# Patient Record
Sex: MALE | Race: OTHER | HISPANIC OR LATINO
[De-identification: names, ages, dates, MRNs, and addresses within clinical notes are randomized per-mention and may not be internally consistent; named-entity substitution may affect disease eponyms.]

---

## 2018-05-29 PROBLEM — Z00.00 ENCOUNTER FOR PREVENTIVE HEALTH EXAMINATION: Status: ACTIVE | Noted: 2018-05-29

## 2018-06-06 ENCOUNTER — APPOINTMENT (OUTPATIENT)
Dept: UROLOGY | Facility: CLINIC | Age: 63
End: 2018-06-06
Payer: COMMERCIAL

## 2018-06-06 VITALS
OXYGEN SATURATION: 98 % | DIASTOLIC BLOOD PRESSURE: 82 MMHG | HEIGHT: 72 IN | WEIGHT: 201 LBS | HEART RATE: 54 BPM | RESPIRATION RATE: 16 BRPM | BODY MASS INDEX: 27.22 KG/M2 | SYSTOLIC BLOOD PRESSURE: 130 MMHG

## 2018-06-06 PROCEDURE — 99213 OFFICE O/P EST LOW 20 MIN: CPT

## 2019-12-31 ENCOUNTER — OUTPATIENT (OUTPATIENT)
Dept: OUTPATIENT SERVICES | Facility: HOSPITAL | Age: 64
LOS: 1 days | End: 2019-12-31
Payer: COMMERCIAL

## 2019-12-31 ENCOUNTER — APPOINTMENT (OUTPATIENT)
Dept: CT IMAGING | Facility: IMAGING CENTER | Age: 64
End: 2019-12-31
Payer: COMMERCIAL

## 2019-12-31 DIAGNOSIS — I77.810 THORACIC AORTIC ECTASIA: ICD-10-CM

## 2019-12-31 PROCEDURE — 71275 CT ANGIOGRAPHY CHEST: CPT

## 2019-12-31 PROCEDURE — 71275 CT ANGIOGRAPHY CHEST: CPT | Mod: 26

## 2020-01-17 ENCOUNTER — APPOINTMENT (OUTPATIENT)
Dept: ELECTROPHYSIOLOGY | Facility: CLINIC | Age: 65
End: 2020-01-17
Payer: COMMERCIAL

## 2020-01-17 ENCOUNTER — NON-APPOINTMENT (OUTPATIENT)
Age: 65
End: 2020-01-17

## 2020-01-17 VITALS
SYSTOLIC BLOOD PRESSURE: 105 MMHG | HEIGHT: 72 IN | HEART RATE: 79 BPM | OXYGEN SATURATION: 98 % | DIASTOLIC BLOOD PRESSURE: 59 MMHG | BODY MASS INDEX: 28.21 KG/M2

## 2020-01-17 VITALS — BODY MASS INDEX: 28.21 KG/M2 | WEIGHT: 208 LBS

## 2020-01-17 DIAGNOSIS — F17.210 NICOTINE DEPENDENCE, CIGARETTES, UNCOMPLICATED: ICD-10-CM

## 2020-01-17 DIAGNOSIS — Z72.89 OTHER PROBLEMS RELATED TO LIFESTYLE: ICD-10-CM

## 2020-01-17 DIAGNOSIS — I10 ESSENTIAL (PRIMARY) HYPERTENSION: ICD-10-CM

## 2020-01-17 PROCEDURE — 93000 ELECTROCARDIOGRAM COMPLETE: CPT

## 2020-01-17 PROCEDURE — 99205 OFFICE O/P NEW HI 60 MIN: CPT

## 2020-01-17 RX ORDER — ACYCLOVIR 400 MG/1
400 TABLET ORAL
Refills: 0 | Status: ACTIVE | COMMUNITY

## 2020-01-17 NOTE — PHYSICAL EXAM
[General Appearance - Well Developed] : well developed [Normal Appearance] : normal appearance [Well Groomed] : well groomed [General Appearance - Well Nourished] : well nourished [No Deformities] : no deformities [General Appearance - In No Acute Distress] : no acute distress [Normal Conjunctiva] : the conjunctiva exhibited no abnormalities [Eyelids - No Xanthelasma] : the eyelids demonstrated no xanthelasmas [No Oral Pallor] : no oral pallor [Normal Oral Mucosa] : normal oral mucosa [No Oral Cyanosis] : no oral cyanosis [Normal Jugular Venous V Waves Present] : normal jugular venous V waves present [Normal Jugular Venous A Waves Present] : normal jugular venous A waves present [No Jugular Venous Saenz A Waves] : no jugular venous saenz A waves [Heart Rate And Rhythm] : heart rate and rhythm were normal [Heart Sounds] : normal S1 and S2 [Respiration, Rhythm And Depth] : normal respiratory rhythm and effort [Murmurs] : no murmurs present [Exaggerated Use Of Accessory Muscles For Inspiration] : no accessory muscle use [Auscultation Breath Sounds / Voice Sounds] : lungs were clear to auscultation bilaterally [Abdomen Soft] : soft [Abdomen Tenderness] : non-tender [Abdomen Mass (___ Cm)] : no abdominal mass palpated [Nail Clubbing] : no clubbing of the fingernails [Gait - Sufficient For Exercise Testing] : the gait was sufficient for exercise testing [Abnormal Walk] : normal gait [Cyanosis, Localized] : no localized cyanosis [Petechial Hemorrhages (___cm)] : no petechial hemorrhages [No Venous Stasis] : no venous stasis [] : no rash [Skin Color & Pigmentation] : normal skin color and pigmentation [No Skin Ulcers] : no skin ulcer [No Xanthoma] : no  xanthoma was observed [Skin Lesions] : no skin lesions [Mood] : the mood was normal [Affect] : the affect was normal [Oriented To Time, Place, And Person] : oriented to person, place, and time [No Anxiety] : not feeling anxious

## 2020-01-18 NOTE — HISTORY OF PRESENT ILLNESS
[FreeTextEntry1] : Abbey Boston MD\par \par Mr. 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  He passed out last year in July 2019 while flying to [place].  He noted he was getting dizzy and then passed out. There was no diaphoresis or nausea and was eating something at the time.  One of the flight attendants had ammonia on board which helped to arouse the patient. He has not had chest pain, dyspnea, palpitations, lightheadedness, or unusual fatigue.  Was obese in the past and developed DM, but that reversed with diet and exercise according to his nephew who is a practicing cardiologist in [place] (Dr. Maribel Santiago). Echocardiogram showed grade II diastolic dysfunction and severe LAE with normal LV systolic function. A Holter monitor showed evidence of tachy kamilah syndrome with heart rates ranging from 43 to 143 bpm with episodes of PAF. MRI of the brain with contrast showed mild microvascular ischemic disease, mild paranasal sinus mucosal thickening and mucosal thickening involving the left mastoid air cells.

## 2020-01-18 NOTE — DISCUSSION/SUMMARY
[FreeTextEntry1] : In summary PAIGE OTTO is a 64 year old with a history of HTN, DM (resolved), paroxysmal atrial fibrillation, evidence of sick sinus and tachy-kamilah syndrome and syncope and is now here for an ILR.  He passed out last year in July 2019 while flying to [place].  He noted he was getting dizzy and then passed out. There was no diaphoresis or nausea and was eating something at the time.  One of the flight attendants had ammonia on board which helped to arouse the patient. He has not had chest pain, dyspnea, palpitations, lightheadedness, or unusual fatigue.  Was obese in the past and developed DM, but that reversed with diet and exercise according to his nephew who is a practicing cardiologist in [place] (Dr. Maribel Santiago). Echocardiogram showed grade II diastolic dysfunction and severe LAE with normal LV systolic function. A Holter monitor showed evidence of tachy kamilah syndrome with heart rates ranging from 43 to 143 bpm with episodes of PAF. MRI of the brain with contrast showed mild microvascular ischemic disease, mild paranasal sinus mucosal thickening and mucosal thickening involving the left mastoid air cells. After discussion with patient and patient's nephew, we decided upon ILR to assess AF burden.  We also considered ablation, but we'll place that on hold for now.  The ILR will also help for the evaluation of syncope. Discussed AC versus ASA and given severe LAE, history of HTN, DM (resolved), grade II diastolic dysfunction, we decided that the patient should be on anticoagulation with Eliquis.  Risks, benefits and alternatives discussed and patient will take AC instead of ASA and undergo ILR. \par \par Sincerely,\par \par Jayy Pradhan MD

## 2020-01-29 ENCOUNTER — OUTPATIENT (OUTPATIENT)
Dept: OUTPATIENT SERVICES | Facility: HOSPITAL | Age: 65
LOS: 1 days | Discharge: HOME | End: 2020-01-29
Payer: COMMERCIAL

## 2020-01-29 DIAGNOSIS — I48.19 OTHER PERSISTENT ATRIAL FIBRILLATION: ICD-10-CM

## 2020-01-29 PROCEDURE — 33285 INSJ SUBQ CAR RHYTHM MNTR: CPT

## 2020-01-29 NOTE — CHART NOTE - NSCHARTNOTEFT_GEN_A_CORE
Type of Procedure: Implantable Loop Recorder Implant  Licensed independent practitioner: Jayy Pradhan MD  Assistant: none  Description of procedure: sterile conditions, antibiotic coverage, ILR implanted 4th ICS left parasternal area  Findings of procedure: Normal sensing  Estimated blood loss: < 10 cc  Specimen removed: none  Preoperative Dx: syncope  Postoperative Dx: syncope  Complications: none  Anesthesia type: local anesthesia    Jayy Pradhan MD

## 2020-01-29 NOTE — H&P CARDIOLOGY - PMH
Benign prostatic hyperplasia without lower urinary tract symptoms    Essential hypertension    Hepatitis C virus infection resolved after antiviral drug therapy    PAF (paroxysmal atrial fibrillation)    SSS (sick sinus syndrome)    Type 2 diabetes mellitus without complication, without long-term current use of insulin

## 2020-01-29 NOTE — H&P CARDIOLOGY - RS GEN PE MLT RESP DETAILS PC
no rales/no wheezes/no chest wall tenderness/no intercostal retractions/good air movement/respirations non-labored/normal/breath sounds equal/no subcutaneous emphysema/no rhonchi/airway patent/clear to auscultation bilaterally

## 2020-01-29 NOTE — H&P CARDIOLOGY - HISTORY OF PRESENT ILLNESS
63 y/o male w/ PMH of HTN/DM  (resolved w/ weight loss)/Hep C (treated) / PAF/ SSS presents to [facility]  for ILR.   See hard  Copy of H&P  from 1/17/20  No new complaints from that date.

## 2020-01-29 NOTE — CHART NOTE - NSCHARTNOTEFT_GEN_A_CORE
Patient is s/p ILR implant. Device site with no bleeding or hematoma. Device teaching given to patient and he demonstrates understanding of the instructions. F/U appointment with device clinic on 2/12/20 @ 9am

## 2020-02-12 ENCOUNTER — APPOINTMENT (OUTPATIENT)
Dept: ELECTROPHYSIOLOGY | Facility: CLINIC | Age: 65
End: 2020-02-12
Payer: COMMERCIAL

## 2020-02-12 VITALS — DIASTOLIC BLOOD PRESSURE: 75 MMHG | RESPIRATION RATE: 14 BRPM | HEART RATE: 56 BPM | SYSTOLIC BLOOD PRESSURE: 163 MMHG

## 2020-02-12 PROBLEM — E11.9 TYPE 2 DIABETES MELLITUS WITHOUT COMPLICATIONS: Chronic | Status: ACTIVE | Noted: 2020-01-29

## 2020-02-12 PROBLEM — I49.5 SICK SINUS SYNDROME: Chronic | Status: ACTIVE | Noted: 2020-01-29

## 2020-02-12 PROBLEM — N40.0 BENIGN PROSTATIC HYPERPLASIA WITHOUT LOWER URINARY TRACT SYMPTOMS: Chronic | Status: ACTIVE | Noted: 2020-01-29

## 2020-02-12 PROBLEM — Z86.19 PERSONAL HISTORY OF OTHER INFECTIOUS AND PARASITIC DISEASES: Chronic | Status: ACTIVE | Noted: 2020-01-29

## 2020-02-12 PROBLEM — I10 ESSENTIAL (PRIMARY) HYPERTENSION: Chronic | Status: ACTIVE | Noted: 2020-01-29

## 2020-02-12 PROBLEM — I48.0 PAROXYSMAL ATRIAL FIBRILLATION: Chronic | Status: ACTIVE | Noted: 2020-01-29

## 2020-02-12 PROCEDURE — 93285 PRGRMG DEV EVAL SCRMS IP: CPT

## 2020-03-10 ENCOUNTER — APPOINTMENT (OUTPATIENT)
Dept: ELECTROPHYSIOLOGY | Facility: CLINIC | Age: 65
End: 2020-03-10
Payer: COMMERCIAL

## 2020-03-10 LAB
ALBUMIN SERPL ELPH-MCNC: 4.8 G/DL
ALP BLD-CCNC: 87 U/L
ALT SERPL-CCNC: 12 U/L
ANION GAP SERPL CALC-SCNC: 17 MMOL/L
AST SERPL-CCNC: 21 U/L
BASOPHILS # BLD AUTO: 0.04 K/UL
BASOPHILS NFR BLD AUTO: 1 %
BILIRUB SERPL-MCNC: 0.7 MG/DL
BUN SERPL-MCNC: 14 MG/DL
CALCIUM SERPL-MCNC: 9.5 MG/DL
CHLORIDE SERPL-SCNC: 100 MMOL/L
CO2 SERPL-SCNC: 24 MMOL/L
CREAT SERPL-MCNC: 0.7 MG/DL
EOSINOPHIL # BLD AUTO: 0.04 K/UL
EOSINOPHIL NFR BLD AUTO: 1 %
GLUCOSE SERPL-MCNC: 86 MG/DL
HCT VFR BLD CALC: 41.1 %
HGB BLD-MCNC: 13.5 G/DL
IMM GRANULOCYTES NFR BLD AUTO: 0 %
LYMPHOCYTES # BLD AUTO: 1.7 K/UL
LYMPHOCYTES NFR BLD AUTO: 41.7 %
MAN DIFF?: NORMAL
MCHC RBC-ENTMCNC: 30.9 PG
MCHC RBC-ENTMCNC: 32.8 GM/DL
MCV RBC AUTO: 94.1 FL
MONOCYTES # BLD AUTO: 0.31 K/UL
MONOCYTES NFR BLD AUTO: 7.6 %
NEUTROPHILS # BLD AUTO: 1.99 K/UL
NEUTROPHILS NFR BLD AUTO: 48.7 %
PLATELET # BLD AUTO: 122 K/UL
POTASSIUM SERPL-SCNC: 3.6 MMOL/L
PROT SERPL-MCNC: 8.2 G/DL
RBC # BLD: 4.37 M/UL
RBC # FLD: 13.6 %
SODIUM SERPL-SCNC: 140 MMOL/L
WBC # FLD AUTO: 4.08 K/UL

## 2020-03-10 PROCEDURE — 36415 COLL VENOUS BLD VENIPUNCTURE: CPT

## 2020-03-11 ENCOUNTER — INPATIENT (INPATIENT)
Facility: HOSPITAL | Age: 65
LOS: 0 days | Discharge: HOME | End: 2020-03-12
Attending: INTERNAL MEDICINE | Admitting: INTERNAL MEDICINE
Payer: COMMERCIAL

## 2020-03-11 VITALS
DIASTOLIC BLOOD PRESSURE: 82 MMHG | SYSTOLIC BLOOD PRESSURE: 150 MMHG | RESPIRATION RATE: 18 BRPM | HEART RATE: 72 BPM | TEMPERATURE: 98 F | OXYGEN SATURATION: 100 %

## 2020-03-11 DIAGNOSIS — I48.19 OTHER PERSISTENT ATRIAL FIBRILLATION: ICD-10-CM

## 2020-03-11 LAB — RH IG SCN BLD-IMP: POSITIVE — SIGNIFICANT CHANGE UP

## 2020-03-11 PROCEDURE — 93662 INTRACARDIAC ECG (ICE): CPT | Mod: 26

## 2020-03-11 PROCEDURE — 93656 COMPRE EP EVAL ABLTJ ATR FIB: CPT

## 2020-03-11 PROCEDURE — 93657 TX L/R ATRIAL FIB ADDL: CPT

## 2020-03-11 PROCEDURE — 93623 PRGRMD STIMJ&PACG IV RX NFS: CPT | Mod: 26

## 2020-03-11 PROCEDURE — 93010 ELECTROCARDIOGRAM REPORT: CPT

## 2020-03-11 PROCEDURE — 93613 INTRACARDIAC EPHYS 3D MAPG: CPT

## 2020-03-11 RX ORDER — SODIUM CHLORIDE 9 MG/ML
3 INJECTION INTRAMUSCULAR; INTRAVENOUS; SUBCUTANEOUS EVERY 8 HOURS
Refills: 0 | Status: DISCONTINUED | OUTPATIENT
Start: 2020-03-11 | End: 2020-03-12

## 2020-03-11 RX ORDER — ACYCLOVIR SODIUM 500 MG
400 VIAL (EA) INTRAVENOUS DAILY
Refills: 0 | Status: DISCONTINUED | OUTPATIENT
Start: 2020-03-11 | End: 2020-03-12

## 2020-03-11 RX ORDER — APIXABAN 2.5 MG/1
5 TABLET, FILM COATED ORAL
Refills: 0 | Status: DISCONTINUED | OUTPATIENT
Start: 2020-03-11 | End: 2020-03-12

## 2020-03-11 RX ORDER — PANTOPRAZOLE SODIUM 20 MG/1
40 TABLET, DELAYED RELEASE ORAL
Refills: 0 | Status: DISCONTINUED | OUTPATIENT
Start: 2020-03-11 | End: 2020-03-12

## 2020-03-11 RX ORDER — ASPIRIN/CALCIUM CARB/MAGNESIUM 324 MG
1 TABLET ORAL
Qty: 0 | Refills: 0 | DISCHARGE

## 2020-03-11 RX ORDER — AMLODIPINE BESYLATE 2.5 MG/1
2.5 TABLET ORAL DAILY
Refills: 0 | Status: DISCONTINUED | OUTPATIENT
Start: 2020-03-11 | End: 2020-03-12

## 2020-03-11 RX ADMIN — APIXABAN 5 MILLIGRAM(S): 2.5 TABLET, FILM COATED ORAL at 18:23

## 2020-03-11 RX ADMIN — SODIUM CHLORIDE 3 MILLILITER(S): 9 INJECTION INTRAMUSCULAR; INTRAVENOUS; SUBCUTANEOUS at 15:41

## 2020-03-11 RX ADMIN — SODIUM CHLORIDE 3 MILLILITER(S): 9 INJECTION INTRAMUSCULAR; INTRAVENOUS; SUBCUTANEOUS at 20:21

## 2020-03-11 NOTE — H&P CARDIOLOGY - PMH
Benign prostatic hyperplasia without lower urinary tract symptoms    Essential hypertension    Hepatitis C virus infection resolved after antiviral drug therapy    PAF (paroxysmal atrial fibrillation)    SSS (sick sinus syndrome)    Type 2 diabetes mellitus without complication, without long-term current use of insulin Benign prostatic hyperplasia without lower urinary tract symptoms    Essential hypertension    Hepatitis C virus infection resolved after antiviral drug therapy    History of loop recorder    PAF (paroxysmal atrial fibrillation)    SSS (sick sinus syndrome)    Type 2 diabetes mellitus without complication, without long-term current use of insulin

## 2020-03-11 NOTE — CHART NOTE - NSCHARTNOTEFT_GEN_A_CORE
Type of procedure: PVI  Licensed independent practitioner: Jayy Pradhan MD  Assistant: None  Description of procedure: After informed consent was obtained, the patient was brought to the Electrophysiology laboratory in the postabsorptive state, and was prepped and draped in the usual sterile fashion. The patient was electively intubated by an anesthesiologist, who provided general anesthesia throughout the case. In addition an esophageal temperature probe was placed into the esophagus to allow temperate monitoring during ablation. Under sterile conditions, femoral venous access was obtained and catheters advanced to the right atrium under fluoroscopic guidance without complications.  Heparin 10,000 units followed by 2400 unit/hour drip was administered to maintain an ACT of 300-400 seconds throughout the case. An endocardial shell of the left atrium was created using ICE guidance and the pulmonary veins, left atrial appendage and esophagus were tagged.  Transeptal access was achieved using fluoroscopic and ICE guidance using an 8.5 Agilis Sheath and C1 Orlando needle. The mean left atrial pressure was 12 mm Hg.  A PentaRay Luis F curve catheter and a 3.5 mm irrigated-tip [employer] ThermoCool ST SF DF-curve ablation catheter were used for mapping and ablation. A FAM and voltage map of the LA were created using a Carto 3D mapping system.  Pacing at 20 ma and 2.0 msec was performed inside and around the anterior portion of the right superior pulmonary vein to exclude phrenic nerve capture and there was none. Circumferential ablation was carried out at the PV antra with careful attention to avoid ablation within the pulmonary veins. Esophageal temperatures juancho from a baseline of 36.3 degrees Celsius to a peak of 37.4 degrees Celsius.  Entrance and exit block of the pulmonary veins remained for >30 minutes, without evidence of acute reconnection. An EP study was performed with and without the use of dobutamine and no arrhythmias were induced. The catheters were removed from the left atrium, and heparin was discontinued and reversed with protamine 50 mg. Repeat ICE imaging revealed no significant pericardial effusion. All catheters and sheaths were removed and hemostasis achieved and at least 20 minutes of holding direct pressure to the access sites. The final left atrial pressure was 5 mmHg (on dobutamine 20 mkm).  The patient tolerated the procedure well and was successfully extubated and transferred to the CCU in stable condition.   Findings of procedure: Successful isolation of all four pulmonary veins.  Estimated blood loss: <10 cc  Specimen removed: none  Preoperative Dx: Afib  Postoperative Dx: Afib  Complications: None  Anesthesia type: General    Jayy Pradhan MD

## 2020-03-11 NOTE — CHART NOTE - NSCHARTNOTEFT_GEN_A_CORE
Patient is s/p atrial fibrillation ablation via RFV access. Dressing clean dry and intact. Positive pedal pulses. Denies numbness tingling sensation. Will continue to monitor.

## 2020-03-11 NOTE — H&P CARDIOLOGY - HISTORY OF PRESENT ILLNESS
64 year old male with   Presents for atrial fibrillation ablation.     please see hard copy H&P in paper chart  PATIENT SEEN AND EXAMINED AND NO NEW CLINICAL CHANGES SINCE office visit 64 year old male with HTN, DM  (resolved w/ weight loss), Hep C (treated) with history of syncope, monitored by holter monitor revealing episodes of paroxysmtal atrial fibrillation and confirmed on loop recorder that was implanted 1/29/2020 who presents for atrial fibrillation. Without complaints, last syncope July 2019.   Admits to taking Eliquis BID as prescribed.   MRI of brain of contrast mild microvascular ischemia disease.

## 2020-03-12 ENCOUNTER — TRANSCRIPTION ENCOUNTER (OUTPATIENT)
Age: 65
End: 2020-03-12

## 2020-03-12 VITALS
SYSTOLIC BLOOD PRESSURE: 150 MMHG | HEART RATE: 73 BPM | RESPIRATION RATE: 18 BRPM | DIASTOLIC BLOOD PRESSURE: 83 MMHG | OXYGEN SATURATION: 100 % | TEMPERATURE: 98 F

## 2020-03-12 LAB
ANION GAP SERPL CALC-SCNC: 10 MMO/L — SIGNIFICANT CHANGE UP (ref 7–14)
BUN SERPL-MCNC: 19 MG/DL — SIGNIFICANT CHANGE UP (ref 7–23)
CALCIUM SERPL-MCNC: 8.7 MG/DL — SIGNIFICANT CHANGE UP (ref 8.4–10.5)
CHLORIDE SERPL-SCNC: 104 MMOL/L — SIGNIFICANT CHANGE UP (ref 98–107)
CO2 SERPL-SCNC: 28 MMOL/L — SIGNIFICANT CHANGE UP (ref 22–31)
CREAT SERPL-MCNC: 0.82 MG/DL — SIGNIFICANT CHANGE UP (ref 0.5–1.3)
GLUCOSE SERPL-MCNC: 102 MG/DL — HIGH (ref 70–99)
HCT VFR BLD CALC: 36 % — LOW (ref 39–50)
HGB BLD-MCNC: 11.9 G/DL — LOW (ref 13–17)
MAGNESIUM SERPL-MCNC: 2.2 MG/DL — SIGNIFICANT CHANGE UP (ref 1.6–2.6)
MCHC RBC-ENTMCNC: 30.7 PG — SIGNIFICANT CHANGE UP (ref 27–34)
MCHC RBC-ENTMCNC: 33.1 % — SIGNIFICANT CHANGE UP (ref 32–36)
MCV RBC AUTO: 93 FL — SIGNIFICANT CHANGE UP (ref 80–100)
NRBC # FLD: 0 K/UL — SIGNIFICANT CHANGE UP (ref 0–0)
PHOSPHATE SERPL-MCNC: 2.8 MG/DL — SIGNIFICANT CHANGE UP (ref 2.5–4.5)
PLATELET # BLD AUTO: 113 K/UL — LOW (ref 150–400)
PMV BLD: 13.2 FL — HIGH (ref 7–13)
POTASSIUM SERPL-MCNC: 4.2 MMOL/L — SIGNIFICANT CHANGE UP (ref 3.5–5.3)
POTASSIUM SERPL-SCNC: 4.2 MMOL/L — SIGNIFICANT CHANGE UP (ref 3.5–5.3)
RBC # BLD: 3.87 M/UL — LOW (ref 4.2–5.8)
RBC # FLD: 13.9 % — SIGNIFICANT CHANGE UP (ref 10.3–14.5)
SODIUM SERPL-SCNC: 142 MMOL/L — SIGNIFICANT CHANGE UP (ref 135–145)
WBC # BLD: 6.89 K/UL — SIGNIFICANT CHANGE UP (ref 3.8–10.5)
WBC # FLD AUTO: 6.89 K/UL — SIGNIFICANT CHANGE UP (ref 3.8–10.5)

## 2020-03-12 RX ORDER — PANTOPRAZOLE SODIUM 20 MG/1
1 TABLET, DELAYED RELEASE ORAL
Qty: 30 | Refills: 0
Start: 2020-03-12 | End: 2020-04-10

## 2020-03-12 RX ADMIN — AMLODIPINE BESYLATE 2.5 MILLIGRAM(S): 2.5 TABLET ORAL at 05:38

## 2020-03-12 RX ADMIN — APIXABAN 5 MILLIGRAM(S): 2.5 TABLET, FILM COATED ORAL at 05:38

## 2020-03-12 RX ADMIN — SODIUM CHLORIDE 3 MILLILITER(S): 9 INJECTION INTRAMUSCULAR; INTRAVENOUS; SUBCUTANEOUS at 05:37

## 2020-03-12 RX ADMIN — PANTOPRAZOLE SODIUM 40 MILLIGRAM(S): 20 TABLET, DELAYED RELEASE ORAL at 05:38

## 2020-03-12 NOTE — DISCHARGE NOTE PROVIDER - NSDCMRMEDTOKEN_GEN_ALL_CORE_FT
acyclovir 400 mg oral tablet: 1 tab(s) orally once a day  amLODIPine 2.5 mg oral tablet: 1 tab(s) orally once a day  Eliquis 5 mg oral tablet: 1 tab(s) orally 2 times a day  pantoprazole 40 mg oral delayed release tablet: 1 tab(s) orally once a day (before a meal)

## 2020-03-12 NOTE — DISCHARGE NOTE PROVIDER - CARE PROVIDER_API CALL
Jayy Pradhan (MD)  Cardiac Electrophysiology; Cardiovascular Disease; Internal Medicine  [address] [place] [address], [address] [place] [address]  Phone: [phone]  Fax: [phone]  Follow Up Time:

## 2020-03-12 NOTE — DISCHARGE NOTE PROVIDER - NSDCCPCAREPLAN_GEN_ALL_CORE_FT
PRINCIPAL DISCHARGE DIAGNOSIS  Diagnosis: S/P ablation of atrial fibrillation  Assessment and Plan of Treatment: You presented for an atrial fibrillation ablation, which was successful. Your femoral site has been stable so your Eliquis was restarted. Follow up with cardiology/EP within 1-2 weeks of discharge.

## 2020-03-12 NOTE — DISCHARGE NOTE PROVIDER - NSDCFUSCHEDAPPT_GEN_ALL_CORE_FT
PAIGE OTTO ; 03/13/2020 ; [facility] [id number]  PAIGE OTTO ; 03/27/2020 ; [facility] [id number]

## 2020-03-12 NOTE — DISCHARGE NOTE NURSING/CASE MANAGEMENT/SOCIAL WORK - PATIENT PORTAL LINK FT
You can access the FollowMyHealth Patient Portal offered by [facility] by registering at the following website: [email or web address]. By joining [facility]’s FollowMyHealth portal, you will also be able to view your health information using other applications (apps) compatible with our system.

## 2020-03-12 NOTE — DISCHARGE NOTE PROVIDER - HOSPITAL COURSE
64 year old male with HTN, DM  (resolved w/ weight loss), Hep C (treated) with history of syncope, monitored by holter monitor revealing episodes of paroxysmtal atrial fibrillation and confirmed on loop recorder that was implanted 1/29/2020 who presents for atrial fibrillation ablation. Patient is s/p successsful atrial fibrillation ablation on 3/11 via RFV access. Site stable. Eliquis restarted.         Patient seen and evaluated, no acute somatic complaints. Reviewed discharge medications with patient; All new medications requiring new prescriptions were sent to the pharmacy of patient's choice. Reviewed need for prescription for previous home medications and new prescriptions sent if requested. Medically cleared/stable for discharge as per Dr. Pradhan with close outpatient follow up within 1-2 weeks of discharge. Patient understands and agrees with plan of care.

## 2020-03-12 NOTE — PROGRESS NOTE ADULT - SUBJECTIVE AND OBJECTIVE BOX
Patient seen and evaluated today.  No significant events overnight.  Patient is feeling good this am and anxious to go home. He denies any CP/SOB/palpitations/lightheadedness/near syncope/syncope overnight. Telemetry review demonstrates SR 70s HR: 73 (03-12-20 @ 10:24) (66 - 73).    MEDICATIONS:  acyclovir   Oral Tab/Cap 400 milliGRAM(s) Oral daily  amLODIPine   Tablet 2.5 milliGRAM(s) Oral daily  apixaban 5 milliGRAM(s) Oral two times a day  pantoprazole    Tablet 40 milliGRAM(s) Oral before breakfast  sodium chloride 0.9% lock flush 3 milliLiter(s) IV Push every 8 hours      REVIEW OF SYSTEMS:  CONSTITUTIONAL: No fever, weight loss, or fatigue  NECK: No pain or stiffness  RESPIRATORY: No cough, wheezing, chills or hemoptysis; No Shortness of Breath  CARDIOVASCULAR: No chest pain, palpitations, passing out, dizziness, or leg swelling  GASTROINTESTINAL: No abdominal or epigastric pain. No nausea, vomiting, or hematemesis; No diarrhea or constipation. No melena or hematochezia.  NEUROLOGICAL: No headaches, memory loss, loss of strength, numbness, or tremors  SKIN: No itching, burning, rashes, or lesions   PSYCHIATRIC: No depression, anxiety, mood swings, or difficulty sleeping  HEME/LYMPH: No easy bruising, or bleeding gums  ALLERGY AND IMMUNOLOGIC: No hives or eczema	  All others negative    PHYSICAL EXAM:  T(C): 36.7 (03-12-20 @ 10:24), Max: 37.1 (03-12-20 @ 05:36)  HR: 73 (03-12-20 @ 10:24) (66 - 73)  BP: 150/83 (03-12-20 @ 10:24) (143/90 - 150/83)  RR: 18 (03-12-20 @ 10:24) (18 - 18)  SpO2: 100% (03-12-20 @ 10:24) (100% - 100%)     Appearance: Normal	  HEENT:   Normal oral mucosa, PERRL, EOMI	  Lymphatic: No lymphadenopathy  Cardiovascular: Normal S1 S2, No JVD, No murmurs, No edema  Respiratory: Lungs clear to auscultation	  Psychiatry: A & O x 3, Mood & affect appropriate  Gastrointestinal:  Soft, Non-tender, + BS	  Skin: No rashes, No ecchymoses, No cyanosis	  Neurologic: Non-focal  Extremities: Normal range of motion, No clubbing, cyanosis or edema, right groin puncture site has bandage intact, no active bleeding or hematoma, site appears benign. Pulse, motor and sensory grossly intact bilateral lower extremities  Vascular: Peripheral pulses palpable 2+ bilaterally      TELEMETRY: 	  as above    LABS:	 	                          11.9   6.89  )-----------( 113      ( 12 Mar 2020 04:50 )             36.0     03-12    142  |  104  |  19  ----------------------------<  102<H>  4.2   |  28  |  0.82    Ca    8.7      12 Mar 2020 04:50  Phos  2.8     03-12  Mg     2.2     03-12

## 2020-03-12 NOTE — PROGRESS NOTE ADULT - ASSESSMENT
64 year old man s/p PVI/AF ablation, post procedure day #1    -Right groin puncture site is clean/dry/benign-pt instructed on groin site care including removal of bandage tomorrow. He was also provided with written instructions and follow up information. He verbalized understanding of all instructions and all questions were answered to his satisfaction  -Patient to continue Eliquis and pantoprazole as outpatient as instructed  -No EP contraindication to discharge with office follow up with Dr. Jayy Pradhan as provided

## 2020-03-12 NOTE — DISCHARGE NOTE PROVIDER - NSDCFUADDAPPT_GEN_ALL_CORE_FT
Follow up with PCP within 1-2 weeks of discharge.   Follow up with electrophysiology team within 1-2 weeks of discharge.

## 2020-03-13 ENCOUNTER — APPOINTMENT (OUTPATIENT)
Dept: ELECTROPHYSIOLOGY | Facility: CLINIC | Age: 65
End: 2020-03-13
Payer: COMMERCIAL

## 2020-03-13 PROCEDURE — G2066: CPT

## 2020-03-13 PROCEDURE — 93298 REM INTERROG DEV EVAL SCRMS: CPT

## 2020-03-24 ENCOUNTER — APPOINTMENT (OUTPATIENT)
Dept: ELECTROPHYSIOLOGY | Facility: CLINIC | Age: 65
End: 2020-03-24

## 2020-04-13 ENCOUNTER — APPOINTMENT (OUTPATIENT)
Dept: ELECTROPHYSIOLOGY | Facility: CLINIC | Age: 65
End: 2020-04-13
Payer: COMMERCIAL

## 2020-04-13 PROBLEM — Z98.890 OTHER SPECIFIED POSTPROCEDURAL STATES: Chronic | Status: ACTIVE | Noted: 2020-03-11

## 2020-04-13 PROCEDURE — G2066: CPT

## 2020-04-13 PROCEDURE — 93298 REM INTERROG DEV EVAL SCRMS: CPT

## 2020-05-14 ENCOUNTER — APPOINTMENT (OUTPATIENT)
Dept: ELECTROPHYSIOLOGY | Facility: CLINIC | Age: 65
End: 2020-05-14
Payer: COMMERCIAL

## 2020-05-14 PROCEDURE — 93298 REM INTERROG DEV EVAL SCRMS: CPT

## 2020-05-14 PROCEDURE — G2066: CPT

## 2020-05-22 ENCOUNTER — APPOINTMENT (OUTPATIENT)
Dept: ELECTROPHYSIOLOGY | Facility: CLINIC | Age: 65
End: 2020-05-22
Payer: COMMERCIAL

## 2020-05-22 PROCEDURE — 99442: CPT

## 2020-05-22 RX ORDER — APIXABAN 5 MG/1
5 TABLET, FILM COATED ORAL
Qty: 180 | Refills: 1 | Status: DISCONTINUED | COMMUNITY
Start: 2020-01-18 | End: 2020-05-22

## 2020-05-22 NOTE — HISTORY OF PRESENT ILLNESS
[Home] : at home, [unfilled] , at the time of the visit. [Medical Office: ([facility])___] : at the medical office located in  [Verbal consent obtained from patient] : the patient, [unfilled] [Time Spent: ___ minutes] : I have spent [unfilled] minutes with the patient on the telephone [FreeTextEntry1] : Patient has been doing really well running mostly up to 4 miles per day, but at times longer runs.  He has been asymptomatic, He has not had chest pain, dyspnea, palpitations, lightheadedness, syncope, near syncope or unusual fatigue.  He underwent AF ablation on March 11, 2020 and since then has had some AF mostly within the first month of ablation, but since that time has had a few seconds of AF and a pause while he was sleeping. As he is asymptomatic will continue to monitor ILR. No change in medications at this time. \par \par BG

## 2020-06-15 ENCOUNTER — APPOINTMENT (OUTPATIENT)
Dept: ELECTROPHYSIOLOGY | Facility: CLINIC | Age: 65
End: 2020-06-15
Payer: COMMERCIAL

## 2020-06-15 PROCEDURE — G2066: CPT

## 2020-06-15 PROCEDURE — 93298 REM INTERROG DEV EVAL SCRMS: CPT

## 2020-07-21 ENCOUNTER — APPOINTMENT (OUTPATIENT)
Dept: ELECTROPHYSIOLOGY | Facility: CLINIC | Age: 65
End: 2020-07-21
Payer: COMMERCIAL

## 2020-07-21 PROCEDURE — 93298 REM INTERROG DEV EVAL SCRMS: CPT

## 2020-07-21 PROCEDURE — G2066: CPT

## 2020-08-12 ENCOUNTER — APPOINTMENT (OUTPATIENT)
Dept: UROLOGY | Facility: CLINIC | Age: 65
End: 2020-08-12
Payer: COMMERCIAL

## 2020-08-12 VITALS
BODY MASS INDEX: 26.41 KG/M2 | WEIGHT: 195 LBS | TEMPERATURE: 98.3 F | RESPIRATION RATE: 14 BRPM | HEART RATE: 69 BPM | DIASTOLIC BLOOD PRESSURE: 87 MMHG | SYSTOLIC BLOOD PRESSURE: 142 MMHG | HEIGHT: 72 IN

## 2020-08-12 PROCEDURE — 99214 OFFICE O/P EST MOD 30 MIN: CPT

## 2020-08-12 NOTE — ASSESSMENT
[FreeTextEntry1] : We had a long discussion regarding PSA level. Different parameters including PSA velocity, age-adjusted PSA, free PSA, etc reviewed. Some studies have indicated that the absolute PSA level may be more accurate than the other parameters listed above. We discussed observation and repeat PSA, prostate biopsy and prostate or pelvic MRI. The false negative rate of MRI was discussed. Risks of biopsy discussed included but were not limited to bleeding, sepsis, bacteremia, urinary tract infection, erectile dysfunction, etc. How the procedure is performed and preparation with antibiotics and enema were discussed. Risks of observation and not proceeding with biopsy were reviewed. Patient was made aware that prostate cancer can exist at any PSA level. Potential complications of delayed prostate cancer diagnosis were discussed.\par will schedule prostate bx \par \par cont lotrisone prn \par cont tamsulosin

## 2020-08-12 NOTE — HISTORY OF PRESENT ILLNESS
[FreeTextEntry1] : Elevated PSA \par Patient is here with an elevated PSA. He has no personal history and no family history of prostate cancer.He has no prior genitourinary history of hematuria, hematospermia, prostatitis, UTI, erectile dysfunction, urolithiasis, epididymal orchitis.  \par No dysuria or hematuria\par psa 4.1 ua neg \par \par h/o bph voiding well on tamsulosin\par Good erections with cialis \par 3=4 cups tea daily \par h/o phimosis improved withlotrisone

## 2020-08-12 NOTE — PHYSICAL EXAM
[General Appearance - Well Developed] : well developed [General Appearance - Well Nourished] : well nourished [Normal Appearance] : normal appearance [Well Groomed] : well groomed [General Appearance - In No Acute Distress] : no acute distress [Abdomen Soft] : soft [Costovertebral Angle Tenderness] : no ~M costovertebral angle tenderness [Abdomen Tenderness] : non-tender [Scrotum] : the scrotum was normal [Urethral Meatus] : meatus normal [Urinary Bladder Findings] : the bladder was normal on palpation [Testes Mass (___cm)] : there were no testicular masses [FreeTextEntry1] : large smooth prostate no nodules  [Edema] : no peripheral edema [Respiration, Rhythm And Depth] : normal respiratory rhythm and effort [] : no respiratory distress [Oriented To Time, Place, And Person] : oriented to person, place, and time [Affect] : the affect was normal [Exaggerated Use Of Accessory Muscles For Inspiration] : no accessory muscle use [Not Anxious] : not anxious [Mood] : the mood was normal [Normal Station and Gait] : the gait and station were normal for the patient's age [No Focal Deficits] : no focal deficits [No Palpable Adenopathy] : no palpable adenopathy

## 2020-08-24 ENCOUNTER — APPOINTMENT (OUTPATIENT)
Dept: ELECTROPHYSIOLOGY | Facility: CLINIC | Age: 65
End: 2020-08-24
Payer: COMMERCIAL

## 2020-08-24 PROCEDURE — 93298 REM INTERROG DEV EVAL SCRMS: CPT

## 2020-08-24 PROCEDURE — G2066: CPT

## 2020-09-02 ENCOUNTER — APPOINTMENT (OUTPATIENT)
Dept: UROLOGY | Facility: CLINIC | Age: 65
End: 2020-09-02
Payer: COMMERCIAL

## 2020-09-02 VITALS
RESPIRATION RATE: 15 BRPM | DIASTOLIC BLOOD PRESSURE: 68 MMHG | HEART RATE: 60 BPM | TEMPERATURE: 97.9 F | SYSTOLIC BLOOD PRESSURE: 156 MMHG

## 2020-09-02 PROCEDURE — 76942 ECHO GUIDE FOR BIOPSY: CPT | Mod: 59

## 2020-09-02 PROCEDURE — 55700: CPT

## 2020-09-03 ENCOUNTER — APPOINTMENT (OUTPATIENT)
Dept: UROLOGY | Facility: CLINIC | Age: 65
End: 2020-09-03
Payer: COMMERCIAL

## 2020-09-03 DIAGNOSIS — R31.0 GROSS HEMATURIA: ICD-10-CM

## 2020-09-03 PROCEDURE — 99213 OFFICE O/P EST LOW 20 MIN: CPT | Mod: 25

## 2020-09-03 PROCEDURE — 51702 INSERT TEMP BLADDER CATH: CPT

## 2020-09-04 ENCOUNTER — APPOINTMENT (OUTPATIENT)
Dept: UROLOGY | Facility: CLINIC | Age: 65
End: 2020-09-04
Payer: COMMERCIAL

## 2020-09-04 VITALS
DIASTOLIC BLOOD PRESSURE: 78 MMHG | SYSTOLIC BLOOD PRESSURE: 134 MMHG | WEIGHT: 195 LBS | BODY MASS INDEX: 26.41 KG/M2 | HEART RATE: 63 BPM | HEIGHT: 72 IN | TEMPERATURE: 97.9 F

## 2020-09-04 PROBLEM — R31.0 GROSS HEMATURIA: Status: ACTIVE | Noted: 2020-09-04

## 2020-09-04 PROCEDURE — 99213 OFFICE O/P EST LOW 20 MIN: CPT

## 2020-09-04 NOTE — PHYSICAL EXAM
[General Appearance - Well Developed] : well developed [Normal Appearance] : normal appearance [General Appearance - Well Nourished] : well nourished [Well Groomed] : well groomed [General Appearance - In No Acute Distress] : no acute distress [Abdomen Tenderness] : non-tender [Abdomen Soft] : soft [Urethral Meatus] : meatus normal [Costovertebral Angle Tenderness] : no ~M costovertebral angle tenderness [Scrotum] : the scrotum was normal [Urinary Bladder Findings] : the bladder was normal on palpation [Testes Mass (___cm)] : there were no testicular masses [Edema] : no peripheral edema [] : no respiratory distress [Respiration, Rhythm And Depth] : normal respiratory rhythm and effort [Exaggerated Use Of Accessory Muscles For Inspiration] : no accessory muscle use [Affect] : the affect was normal [Mood] : the mood was normal [Oriented To Time, Place, And Person] : oriented to person, place, and time [Not Anxious] : not anxious [Normal Station and Gait] : the gait and station were normal for the patient's age [No Palpable Adenopathy] : no palpable adenopathy [No Focal Deficits] : no focal deficits

## 2020-09-04 NOTE — HISTORY OF PRESENT ILLNESS
[FreeTextEntry1] : cc difficulty voiding \par 64 yo male s/p prostate bx \par reports difficulty voidng since am \par has been having gross heamturia but voiding well until this am \par no fever

## 2020-09-04 NOTE — ASSESSMENT
[FreeTextEntry1] : lacy placed 400 ml bloody utinr \par irrigated no clots - light punch colored\par will observe \par hydration \par call if lacy stops draining \par will double tmasulosin \par f/u tomorrow

## 2020-09-08 NOTE — HISTORY OF PRESENT ILLNESS
[FreeTextEntry1] : cc difficulty voiding \par 66 yo male s/p prostate bx \par went into retention had lacy placed yesterday \par cath stopped draining and pt removed cath himself \par now voiding well clear urine

## 2020-09-09 ENCOUNTER — APPOINTMENT (OUTPATIENT)
Dept: UROLOGY | Facility: CLINIC | Age: 65
End: 2020-09-09
Payer: COMMERCIAL

## 2020-09-09 LAB — CORE LAB BIOPSY: NORMAL

## 2020-09-09 PROCEDURE — 99214 OFFICE O/P EST MOD 30 MIN: CPT

## 2020-09-09 NOTE — PHYSICAL EXAM
[General Appearance - Well Nourished] : well nourished [General Appearance - Well Developed] : well developed [Normal Appearance] : normal appearance [General Appearance - In No Acute Distress] : no acute distress [Well Groomed] : well groomed [Abdomen Tenderness] : non-tender [Abdomen Soft] : soft [Costovertebral Angle Tenderness] : no ~M costovertebral angle tenderness [Urethral Meatus] : meatus normal [Testes Mass (___cm)] : there were no testicular masses [Urinary Bladder Findings] : the bladder was normal on palpation [Scrotum] : the scrotum was normal [Edema] : no peripheral edema [Respiration, Rhythm And Depth] : normal respiratory rhythm and effort [] : no respiratory distress [Affect] : the affect was normal [Exaggerated Use Of Accessory Muscles For Inspiration] : no accessory muscle use [Oriented To Time, Place, And Person] : oriented to person, place, and time [Mood] : the mood was normal [Not Anxious] : not anxious [Normal Station and Gait] : the gait and station were normal for the patient's age [No Focal Deficits] : no focal deficits [No Palpable Adenopathy] : no palpable adenopathy

## 2020-09-29 ENCOUNTER — APPOINTMENT (OUTPATIENT)
Dept: ELECTROPHYSIOLOGY | Facility: CLINIC | Age: 65
End: 2020-09-29
Payer: COMMERCIAL

## 2020-09-29 PROCEDURE — 93298 REM INTERROG DEV EVAL SCRMS: CPT

## 2020-09-29 PROCEDURE — G2066: CPT

## 2020-11-03 ENCOUNTER — APPOINTMENT (OUTPATIENT)
Dept: ELECTROPHYSIOLOGY | Facility: CLINIC | Age: 65
End: 2020-11-03
Payer: COMMERCIAL

## 2020-11-03 PROCEDURE — G2066: CPT

## 2020-11-03 PROCEDURE — 93298 REM INTERROG DEV EVAL SCRMS: CPT

## 2020-11-09 ENCOUNTER — NON-APPOINTMENT (OUTPATIENT)
Age: 65
End: 2020-11-09

## 2020-12-08 ENCOUNTER — APPOINTMENT (OUTPATIENT)
Dept: ELECTROPHYSIOLOGY | Facility: CLINIC | Age: 65
End: 2020-12-08
Payer: COMMERCIAL

## 2020-12-08 PROCEDURE — G2066: CPT

## 2020-12-08 PROCEDURE — 93298 REM INTERROG DEV EVAL SCRMS: CPT

## 2021-01-12 ENCOUNTER — APPOINTMENT (OUTPATIENT)
Dept: ELECTROPHYSIOLOGY | Facility: CLINIC | Age: 66
End: 2021-01-12
Payer: COMMERCIAL

## 2021-01-12 PROCEDURE — 93298 REM INTERROG DEV EVAL SCRMS: CPT

## 2021-01-12 PROCEDURE — G2066: CPT

## 2021-01-13 ENCOUNTER — APPOINTMENT (OUTPATIENT)
Dept: UROLOGY | Facility: CLINIC | Age: 66
End: 2021-01-13
Payer: COMMERCIAL

## 2021-01-13 ENCOUNTER — LABORATORY RESULT (OUTPATIENT)
Age: 66
End: 2021-01-13

## 2021-01-13 VITALS
RESPIRATION RATE: 15 BRPM | DIASTOLIC BLOOD PRESSURE: 86 MMHG | TEMPERATURE: 98 F | HEIGHT: 72 IN | HEART RATE: 60 BPM | SYSTOLIC BLOOD PRESSURE: 156 MMHG

## 2021-01-13 PROCEDURE — 99214 OFFICE O/P EST MOD 30 MIN: CPT

## 2021-01-13 PROCEDURE — 99072 ADDL SUPL MATRL&STAF TM PHE: CPT

## 2021-01-26 NOTE — HISTORY OF PRESENT ILLNESS
[FreeTextEntry1] : cc lesion o nglans \par \par 64 yo male h/o elevated ps as/p benign bx \par ed doing well with tadalfil\par noted red lesion on glans last few days

## 2021-01-26 NOTE — PHYSICAL EXAM
[General Appearance - Well Developed] : well developed [General Appearance - Well Nourished] : well nourished [Normal Appearance] : normal appearance [Well Groomed] : well groomed [General Appearance - In No Acute Distress] : no acute distress [Abdomen Soft] : soft [Costovertebral Angle Tenderness] : no ~M costovertebral angle tenderness [Abdomen Tenderness] : non-tender [Urethral Meatus] : meatus normal [Urinary Bladder Findings] : the bladder was normal on palpation [Scrotum] : the scrotum was normal [Testes Mass (___cm)] : there were no testicular masses [FreeTextEntry1] : 1cm erythematous lesion on glans  [Edema] : no peripheral edema [] : no respiratory distress [Exaggerated Use Of Accessory Muscles For Inspiration] : no accessory muscle use [Respiration, Rhythm And Depth] : normal respiratory rhythm and effort [Oriented To Time, Place, And Person] : oriented to person, place, and time [Affect] : the affect was normal [Mood] : the mood was normal [Not Anxious] : not anxious [Normal Station and Gait] : the gait and station were normal for the patient's age [No Focal Deficits] : no focal deficits [No Palpable Adenopathy] : no palpable adenopathy

## 2021-01-27 LAB
ANION GAP SERPL CALC-SCNC: 13 MMOL/L
BUN SERPL-MCNC: 18 MG/DL
C TRACH RRNA SPEC QL NAA+PROBE: NOT DETECTED
CALCIUM SERPL-MCNC: 9.6 MG/DL
CHLORIDE SERPL-SCNC: 99 MMOL/L
CO2 SERPL-SCNC: 26 MMOL/L
CREAT SERPL-MCNC: 0.81 MG/DL
GLUCOSE SERPL-MCNC: 92 MG/DL
HIV1+2 AB SPEC QL IA.RAPID: NONREACTIVE
HSV 1+2 IGG SER IA-IMP: POSITIVE
HSV 1+2 IGG SER IA-IMP: POSITIVE
HSV1 IGG SER QL: 12.2 INDEX
HSV1 IGM SER QL: NORMAL TITER
HSV2 AB FLD-ACNC: NORMAL TITER
HSV2 IGG SER QL: 22.7 INDEX
N GONORRHOEA RRNA SPEC QL NAA+PROBE: NOT DETECTED
POTASSIUM SERPL-SCNC: 4.2 MMOL/L
SODIUM SERPL-SCNC: 138 MMOL/L
SOURCE AMPLIFICATION: NORMAL
T PALLIDUM AB SER QL IA: NEGATIVE

## 2021-02-10 ENCOUNTER — APPOINTMENT (OUTPATIENT)
Dept: ELECTROPHYSIOLOGY | Facility: CLINIC | Age: 66
End: 2021-02-10
Payer: COMMERCIAL

## 2021-02-10 VITALS — HEART RATE: 60 BPM | SYSTOLIC BLOOD PRESSURE: 147 MMHG | DIASTOLIC BLOOD PRESSURE: 90 MMHG | RESPIRATION RATE: 14 BRPM

## 2021-02-10 DIAGNOSIS — Z87.898 PERSONAL HISTORY OF OTHER SPECIFIED CONDITIONS: ICD-10-CM

## 2021-02-10 PROCEDURE — 99072 ADDL SUPL MATRL&STAF TM PHE: CPT

## 2021-02-10 PROCEDURE — 93285 PRGRMG DEV EVAL SCRMS IP: CPT

## 2021-02-10 RX ORDER — CLOTRIMAZOLE AND BETAMETHASONE DIPROPIONATE 10; .5 MG/G; MG/G
1-0.05 CREAM TOPICAL TWICE DAILY
Qty: 1 | Refills: 1 | Status: DISCONTINUED | COMMUNITY
Start: 2020-08-12 | End: 2021-02-10

## 2021-03-16 ENCOUNTER — APPOINTMENT (OUTPATIENT)
Dept: ELECTROPHYSIOLOGY | Facility: CLINIC | Age: 66
End: 2021-03-16
Payer: COMMERCIAL

## 2021-03-16 ENCOUNTER — NON-APPOINTMENT (OUTPATIENT)
Age: 66
End: 2021-03-16

## 2021-03-16 PROCEDURE — G2066: CPT

## 2021-03-16 PROCEDURE — 93298 REM INTERROG DEV EVAL SCRMS: CPT

## 2021-04-20 ENCOUNTER — NON-APPOINTMENT (OUTPATIENT)
Age: 66
End: 2021-04-20

## 2021-04-20 ENCOUNTER — APPOINTMENT (OUTPATIENT)
Dept: ELECTROPHYSIOLOGY | Facility: CLINIC | Age: 66
End: 2021-04-20
Payer: COMMERCIAL

## 2021-04-20 PROCEDURE — G2066: CPT

## 2021-04-20 PROCEDURE — 93298 REM INTERROG DEV EVAL SCRMS: CPT

## 2021-06-07 ENCOUNTER — RX RENEWAL (OUTPATIENT)
Age: 66
End: 2021-06-07

## 2021-06-09 ENCOUNTER — APPOINTMENT (OUTPATIENT)
Dept: UROLOGY | Facility: CLINIC | Age: 66
End: 2021-06-09
Payer: COMMERCIAL

## 2021-06-09 DIAGNOSIS — N48.9 DISORDER OF PENIS, UNSPECIFIED: ICD-10-CM

## 2021-06-09 PROCEDURE — 99072 ADDL SUPL MATRL&STAF TM PHE: CPT

## 2021-06-09 PROCEDURE — 99214 OFFICE O/P EST MOD 30 MIN: CPT

## 2021-06-28 NOTE — HISTORY OF PRESENT ILLNESS
[FreeTextEntry1] : cc lf/u ed \par \par 66 yo male h/o elevated psa s/p benign bx \par ed doing well with tadalfil\par voiding well tamsulosin\par red lesion on glans improved with lotrisone

## 2021-06-30 ENCOUNTER — APPOINTMENT (OUTPATIENT)
Dept: ELECTROPHYSIOLOGY | Facility: CLINIC | Age: 66
End: 2021-06-30
Payer: COMMERCIAL

## 2021-06-30 ENCOUNTER — NON-APPOINTMENT (OUTPATIENT)
Age: 66
End: 2021-06-30

## 2021-06-30 PROCEDURE — 93298 REM INTERROG DEV EVAL SCRMS: CPT

## 2021-06-30 PROCEDURE — G2066: CPT

## 2021-08-04 ENCOUNTER — APPOINTMENT (OUTPATIENT)
Dept: ELECTROPHYSIOLOGY | Facility: CLINIC | Age: 66
End: 2021-08-04
Payer: COMMERCIAL

## 2021-08-04 ENCOUNTER — NON-APPOINTMENT (OUTPATIENT)
Age: 66
End: 2021-08-04

## 2021-08-04 PROCEDURE — G2066: CPT

## 2021-08-04 PROCEDURE — 93298 REM INTERROG DEV EVAL SCRMS: CPT

## 2021-08-05 ENCOUNTER — APPOINTMENT (OUTPATIENT)
Dept: UROLOGY | Facility: CLINIC | Age: 66
End: 2021-08-05
Payer: COMMERCIAL

## 2021-08-05 PROCEDURE — 99214 OFFICE O/P EST MOD 30 MIN: CPT

## 2021-08-06 LAB
ANION GAP SERPL CALC-SCNC: 14 MMOL/L
BUN SERPL-MCNC: 20 MG/DL
CALCIUM SERPL-MCNC: 9.8 MG/DL
CHLORIDE SERPL-SCNC: 100 MMOL/L
CO2 SERPL-SCNC: 27 MMOL/L
CREAT SERPL-MCNC: 0.89 MG/DL
GLUCOSE SERPL-MCNC: 88 MG/DL
POTASSIUM SERPL-SCNC: 5 MMOL/L
PSA FREE FLD-MCNC: 21 %
PSA FREE SERPL-MCNC: 1.67 NG/ML
PSA SERPL-MCNC: 7.86 NG/ML
SODIUM SERPL-SCNC: 141 MMOL/L

## 2021-08-09 LAB — BACTERIA UR CULT: NORMAL

## 2021-08-19 NOTE — ASSESSMENT
[FreeTextEntry1] : acute rise in psa \par will repeat if stil lhigh will get mri\par \par cont tamsulosin \par check ua cx

## 2021-08-19 NOTE — HISTORY OF PRESENT ILLNESS
[FreeTextEntry1] : cc lf/u ed /psa\par \par 64 yo male h/o elevated psa s/p benign bx \par recent psa 7.09\par ed doing well with tadalfil\par voiding well tamsulosin\par red lesion on glans improved with lotrisone

## 2021-09-08 ENCOUNTER — APPOINTMENT (OUTPATIENT)
Dept: ELECTROPHYSIOLOGY | Facility: CLINIC | Age: 66
End: 2021-09-08
Payer: COMMERCIAL

## 2021-09-08 ENCOUNTER — NON-APPOINTMENT (OUTPATIENT)
Age: 66
End: 2021-09-08

## 2021-09-08 PROCEDURE — G2066: CPT

## 2021-09-08 PROCEDURE — 93298 REM INTERROG DEV EVAL SCRMS: CPT

## 2021-10-13 ENCOUNTER — NON-APPOINTMENT (OUTPATIENT)
Age: 66
End: 2021-10-13

## 2021-10-13 ENCOUNTER — APPOINTMENT (OUTPATIENT)
Dept: ELECTROPHYSIOLOGY | Facility: CLINIC | Age: 66
End: 2021-10-13
Payer: COMMERCIAL

## 2021-10-13 PROCEDURE — 93298 REM INTERROG DEV EVAL SCRMS: CPT

## 2021-10-13 PROCEDURE — G2066: CPT | Mod: NC

## 2021-11-17 ENCOUNTER — NON-APPOINTMENT (OUTPATIENT)
Age: 66
End: 2021-11-17

## 2021-11-17 ENCOUNTER — APPOINTMENT (OUTPATIENT)
Dept: ELECTROPHYSIOLOGY | Facility: CLINIC | Age: 66
End: 2021-11-17
Payer: COMMERCIAL

## 2021-11-17 PROCEDURE — G2066: CPT | Mod: NC

## 2021-11-17 PROCEDURE — 93298 REM INTERROG DEV EVAL SCRMS: CPT | Mod: NC

## 2021-12-22 ENCOUNTER — APPOINTMENT (OUTPATIENT)
Dept: ELECTROPHYSIOLOGY | Facility: CLINIC | Age: 66
End: 2021-12-22
Payer: COMMERCIAL

## 2021-12-22 ENCOUNTER — NON-APPOINTMENT (OUTPATIENT)
Age: 66
End: 2021-12-22

## 2021-12-22 PROCEDURE — 93298 REM INTERROG DEV EVAL SCRMS: CPT

## 2021-12-22 PROCEDURE — G2066: CPT

## 2022-01-07 ENCOUNTER — NON-APPOINTMENT (OUTPATIENT)
Age: 67
End: 2022-01-07

## 2022-01-26 ENCOUNTER — APPOINTMENT (OUTPATIENT)
Dept: ELECTROPHYSIOLOGY | Facility: CLINIC | Age: 67
End: 2022-01-26
Payer: COMMERCIAL

## 2022-01-26 ENCOUNTER — NON-APPOINTMENT (OUTPATIENT)
Age: 67
End: 2022-01-26

## 2022-01-26 PROCEDURE — 93298 REM INTERROG DEV EVAL SCRMS: CPT

## 2022-01-26 PROCEDURE — G2066: CPT

## 2022-02-15 ENCOUNTER — NON-APPOINTMENT (OUTPATIENT)
Age: 67
End: 2022-02-15

## 2022-02-15 ENCOUNTER — APPOINTMENT (OUTPATIENT)
Dept: ELECTROPHYSIOLOGY | Facility: CLINIC | Age: 67
End: 2022-02-15
Payer: COMMERCIAL

## 2022-02-15 DIAGNOSIS — I48.19 OTHER PERSISTENT ATRIAL FIBRILLATION: ICD-10-CM

## 2022-02-15 PROCEDURE — 93285 PRGRMG DEV EVAL SCRMS IP: CPT

## 2022-02-15 RX ORDER — AMLODIPINE BESYLATE 2.5 MG/1
2.5 TABLET ORAL
Refills: 0 | Status: DISCONTINUED | COMMUNITY
End: 2022-02-15

## 2022-02-15 RX ORDER — CLOTRIMAZOLE AND BETAMETHASONE DIPROPIONATE 10; .5 MG/G; MG/G
1-0.05 CREAM TOPICAL TWICE DAILY
Qty: 1 | Refills: 1 | Status: DISCONTINUED | COMMUNITY
Start: 2021-06-09 | End: 2022-02-15

## 2022-03-18 ENCOUNTER — APPOINTMENT (OUTPATIENT)
Dept: ELECTROPHYSIOLOGY | Facility: CLINIC | Age: 67
End: 2022-03-18
Payer: COMMERCIAL

## 2022-03-18 ENCOUNTER — NON-APPOINTMENT (OUTPATIENT)
Age: 67
End: 2022-03-18

## 2022-03-18 PROCEDURE — 93298 REM INTERROG DEV EVAL SCRMS: CPT

## 2022-03-18 PROCEDURE — G2066: CPT

## 2022-04-19 ENCOUNTER — APPOINTMENT (OUTPATIENT)
Dept: ELECTROPHYSIOLOGY | Facility: CLINIC | Age: 67
End: 2022-04-19
Payer: COMMERCIAL

## 2022-04-19 ENCOUNTER — NON-APPOINTMENT (OUTPATIENT)
Age: 67
End: 2022-04-19

## 2022-04-19 PROCEDURE — G2066: CPT

## 2022-04-19 PROCEDURE — 93298 REM INTERROG DEV EVAL SCRMS: CPT

## 2022-05-24 ENCOUNTER — APPOINTMENT (OUTPATIENT)
Dept: ELECTROPHYSIOLOGY | Facility: CLINIC | Age: 67
End: 2022-05-24
Payer: COMMERCIAL

## 2022-05-24 ENCOUNTER — NON-APPOINTMENT (OUTPATIENT)
Age: 67
End: 2022-05-24

## 2022-05-24 PROCEDURE — G2066: CPT

## 2022-05-24 PROCEDURE — 93298 REM INTERROG DEV EVAL SCRMS: CPT

## 2022-05-25 NOTE — H&P CARDIOLOGY - REVIEW OF SYSTEMS
" Patient ID: Hang Rivera Jr. is a 69 y.o. male.    Chief Complaint: Abdominal Pain    HPI      Hang Rivera Jr. is a 69 y.o. male pain in left groin and abd for 3 to 4 weeks.  Worse with working or straining.  Went around with pants un buttoned.  Actually better today.  NO bowel changes. NO fever or chills.  NO  Urine changes.  Feels a bulge in the left area.  Points to left upper scrotal area and left suprapubic      Vitals:    05/25/22 1357   BP: 122/80   BP Location: Left arm   Patient Position: Sitting   Pulse: 102   Temp: 98.2 °F (36.8 °C)   TempSrc: Oral   SpO2: 95%   Weight: 82.3 kg (181 lb 8.8 oz)   Height: 5' 9" (1.753 m)            Review of Symptoms      Physical Exam    Constitutional:  Oriented to person, place, and time.appears well-developed and well-nourished.  No distress.      HENT  Head: Normocephalic and atraumatic  Right Ear: External ear normal.   Left Ear: External ear normal.   Nose: External nose normal.   Mouth:  Moist mucus membranes.    Eyes:  Conjunctivae are normal. Right eye exhibits no discharge.  Left eye exhibits no discharge. No scleral icterus.  No periorbital edema    Cardiovascular:  Regular rate and rhythm with normal S1 and S2     Pulmonary/Chest:   Clear to auscultation bilaterally without wheezes, rhonchi or rales      Abdomen:  Soft non tender, non distended, No hepatic or splenic enlargement.  No rebound or guarding.     normal circe male with no inguinal hernia        Musculoskeletal:  No edema. No obvious deformity No wasting       Neurological:  Alert and oriented to person, place, and time.   Coordination normal.     Skin:   Skin is warm and dry.  No diaphoresis.   No rash noted.     Psychiatric: Normal mood and affect. Behavior is normal.  Judgment and thought content normal.     Complete Blood Count  Lab Results   Component Value Date    RBC 4.94 04/01/2022    HGB 15.8 04/01/2022    HCT 43.7 04/01/2022    MCV 89 04/01/2022    MCH 32.0 (H) 04/01/2022    MCHC 36.2 " (H) 04/01/2022    RDW 13.0 04/01/2022     04/01/2022    MPV 12.6 04/01/2022    GRAN 2.3 04/01/2022    GRAN 50.8 04/01/2022    LYMPH 1.4 04/01/2022    LYMPH 32.1 04/01/2022    MONO 0.4 04/01/2022    MONO 9.1 04/01/2022    EOS 0.3 04/01/2022    BASO 0.03 04/01/2022    EOSINOPHIL 6.9 04/01/2022    BASOPHIL 0.7 04/01/2022    DIFFMETHOD Automated 04/01/2022       Comprehensive Metabolic Panel  Lab Results   Component Value Date     (H) 04/01/2022    BUN 27 (H) 04/01/2022    CREATININE 1.11 04/01/2022     04/01/2022    K 4.5 04/01/2022     04/01/2022    PROT 7.9 04/01/2022    ALBUMIN 4.6 04/01/2022    BILITOT 0.9 04/01/2022    AST 31 04/01/2022    ALKPHOS 112 04/01/2022    CO2 30 (H) 04/01/2022    ALT 30 04/01/2022    ANIONGAP 10 04/01/2022    EGFRNONAA >60.0 04/01/2022    ESTGFRAFRICA >60.0 04/01/2022       TSH  Lab Results   Component Value Date    TSH 1.430 04/01/2022       Assessment / Plan:      ICD-10-CM ICD-9-CM   1. Epididymitis  N45.1 604.90   2. Type 2 diabetes mellitus without complication, without long-term current use of insulin  E11.9 250.00   3. Need for shingles vaccine  Z23 V04.89     Epididymitis    Type 2 diabetes mellitus without complication, without long-term current use of insulin  -     Ambulatory referral/consult to Ophthalmology; Future; Expected date: 06/01/2022  -     Hemoglobin A1C; Future; Expected date: 05/25/2022    Need for shingles vaccine  -     (In Office Administered) Zoster Recombinant Vaccine    Other orders  -     doxycycline (VIBRA-TABS) 100 MG tablet; Take 1 tablet (100 mg total) by mouth 2 (two) times daily. Any generic brand capsule or tablet  Dispense: 30 tablet; Refill: 0         as per HPI otherwise comprehensively negative

## 2022-06-02 ENCOUNTER — RX RENEWAL (OUTPATIENT)
Age: 67
End: 2022-06-02

## 2022-06-28 ENCOUNTER — APPOINTMENT (OUTPATIENT)
Dept: ELECTROPHYSIOLOGY | Facility: CLINIC | Age: 67
End: 2022-06-28

## 2022-06-28 ENCOUNTER — NON-APPOINTMENT (OUTPATIENT)
Age: 67
End: 2022-06-28

## 2022-06-28 PROCEDURE — 93298 REM INTERROG DEV EVAL SCRMS: CPT

## 2022-06-28 PROCEDURE — G2066: CPT

## 2022-08-03 ENCOUNTER — NON-APPOINTMENT (OUTPATIENT)
Age: 67
End: 2022-08-03

## 2022-08-03 ENCOUNTER — APPOINTMENT (OUTPATIENT)
Dept: ELECTROPHYSIOLOGY | Facility: CLINIC | Age: 67
End: 2022-08-03

## 2022-08-03 PROCEDURE — 93298 REM INTERROG DEV EVAL SCRMS: CPT

## 2022-08-03 PROCEDURE — G2066: CPT

## 2022-09-07 ENCOUNTER — APPOINTMENT (OUTPATIENT)
Dept: UROLOGY | Facility: CLINIC | Age: 67
End: 2022-09-07

## 2022-09-07 ENCOUNTER — NON-APPOINTMENT (OUTPATIENT)
Age: 67
End: 2022-09-07

## 2022-09-07 ENCOUNTER — APPOINTMENT (OUTPATIENT)
Dept: ELECTROPHYSIOLOGY | Facility: CLINIC | Age: 67
End: 2022-09-07

## 2022-09-07 VITALS
RESPIRATION RATE: 16 BRPM | HEART RATE: 73 BPM | OXYGEN SATURATION: 98 % | SYSTOLIC BLOOD PRESSURE: 129 MMHG | WEIGHT: 202 LBS | TEMPERATURE: 97.8 F | HEIGHT: 72 IN | DIASTOLIC BLOOD PRESSURE: 76 MMHG | BODY MASS INDEX: 27.36 KG/M2

## 2022-09-07 DIAGNOSIS — N47.1 PHIMOSIS: ICD-10-CM

## 2022-09-07 PROCEDURE — G2066: CPT

## 2022-09-07 PROCEDURE — 99214 OFFICE O/P EST MOD 30 MIN: CPT

## 2022-09-07 PROCEDURE — 93298 REM INTERROG DEV EVAL SCRMS: CPT

## 2022-09-07 RX ORDER — CLOTRIMAZOLE AND BETAMETHASONE DIPROPIONATE 10; .5 MG/G; MG/G
1-0.05 CREAM TOPICAL TWICE DAILY
Qty: 1 | Refills: 1 | Status: ACTIVE | COMMUNITY
Start: 2022-09-07 | End: 1900-01-01

## 2022-09-09 LAB — PSA SERPL-MCNC: 6.11 NG/ML

## 2022-09-15 NOTE — ASSESSMENT
[FreeTextEntry1] : doing well\par \par cont tamsulosin \par \par repeat psa \par \par tadalafil as needed\par \par lotrisone as needed There are no Wet Read(s) to document.

## 2022-09-15 NOTE — HISTORY OF PRESENT ILLNESS
[FreeTextEntry1] : cc lf/u ed /psa\par \par 66 yo male h/o elevated psa s/p benign bx \par last psa 7.86\par mri pirad 1 125 cc\par ed doing well with tadalfil\par voiding well tamsulosin\par red lesion on glans improved with lotrisone

## 2022-10-12 ENCOUNTER — NON-APPOINTMENT (OUTPATIENT)
Age: 67
End: 2022-10-12

## 2022-10-12 ENCOUNTER — APPOINTMENT (OUTPATIENT)
Dept: ELECTROPHYSIOLOGY | Facility: CLINIC | Age: 67
End: 2022-10-12

## 2022-10-12 PROCEDURE — 93298 REM INTERROG DEV EVAL SCRMS: CPT

## 2022-10-12 PROCEDURE — G2066: CPT

## 2022-11-15 ENCOUNTER — NON-APPOINTMENT (OUTPATIENT)
Age: 67
End: 2022-11-15

## 2022-11-15 ENCOUNTER — APPOINTMENT (OUTPATIENT)
Dept: ELECTROPHYSIOLOGY | Facility: CLINIC | Age: 67
End: 2022-11-15

## 2022-11-15 PROCEDURE — G2066: CPT

## 2022-11-15 PROCEDURE — 93298 REM INTERROG DEV EVAL SCRMS: CPT

## 2022-12-01 ENCOUNTER — RX RENEWAL (OUTPATIENT)
Age: 67
End: 2022-12-01

## 2022-12-13 ENCOUNTER — RX RENEWAL (OUTPATIENT)
Age: 67
End: 2022-12-13

## 2022-12-13 RX ORDER — TAMSULOSIN HYDROCHLORIDE 0.4 MG/1
0.4 CAPSULE ORAL
Qty: 180 | Refills: 1 | Status: ACTIVE | COMMUNITY
Start: 2022-06-02 | End: 1900-01-01

## 2022-12-20 ENCOUNTER — NON-APPOINTMENT (OUTPATIENT)
Age: 67
End: 2022-12-20

## 2022-12-20 ENCOUNTER — APPOINTMENT (OUTPATIENT)
Dept: ELECTROPHYSIOLOGY | Facility: CLINIC | Age: 67
End: 2022-12-20

## 2022-12-20 PROCEDURE — 93295 DEV INTERROG REMOTE 1/2/MLT: CPT

## 2022-12-20 PROCEDURE — 93296 REM INTERROG EVL PM/IDS: CPT

## 2022-12-29 ENCOUNTER — NON-APPOINTMENT (OUTPATIENT)
Age: 67
End: 2022-12-29

## 2022-12-29 ENCOUNTER — APPOINTMENT (OUTPATIENT)
Dept: UROLOGY | Facility: CLINIC | Age: 67
End: 2022-12-29

## 2022-12-29 PROCEDURE — 99214 OFFICE O/P EST MOD 30 MIN: CPT | Mod: 25

## 2022-12-29 PROCEDURE — 51702 INSERT TEMP BLADDER CATH: CPT

## 2022-12-29 NOTE — PHYSICAL EXAM
[Normal Appearance] : normal appearance [General Appearance - In No Acute Distress] : no acute distress [Abdomen Soft] : soft [FreeTextEntry1] : sp tenderness [Urethral Meatus] : meatus normal [Scrotum] : the scrotum was normal

## 2022-12-29 NOTE — HISTORY OF PRESENT ILLNESS
[FreeTextEntry1] : cc retention \par 68 yo male h/o bph \par had knee surgery 2 days ago \par since then has frequency dribbling \par was off his tamsulosin presurgery \par has not moved his bowels

## 2022-12-29 NOTE — ASSESSMENT
[FreeTextEntry1] : bladder scan 1000ml \par lacy placed clear urine \par cont tamsulosin\par bowel regimen \par f/u voiding trial

## 2023-01-03 ENCOUNTER — APPOINTMENT (OUTPATIENT)
Dept: UROLOGY | Facility: CLINIC | Age: 68
End: 2023-01-03
Payer: COMMERCIAL

## 2023-01-03 PROCEDURE — 99213 OFFICE O/P EST LOW 20 MIN: CPT | Mod: 25

## 2023-01-03 PROCEDURE — 51702 INSERT TEMP BLADDER CATH: CPT

## 2023-01-03 NOTE — ASSESSMENT
[FreeTextEntry1] : lacy removed pt unable to void and cath replaced\par f/u friday for voiding trial \par cont tamsulosin

## 2023-01-03 NOTE — PHYSICAL EXAM
[Normal Appearance] : normal appearance [General Appearance - In No Acute Distress] : no acute distress [Abdomen Soft] : soft [Urethral Meatus] : meatus normal [Scrotum] : the scrotum was normal [FreeTextEntry1] : cath clear urine [Edema] : no peripheral edema [] : no respiratory distress [Respiration, Rhythm And Depth] : normal respiratory rhythm and effort [Exaggerated Use Of Accessory Muscles For Inspiration] : no accessory muscle use [Oriented To Time, Place, And Person] : oriented to person, place, and time [Affect] : the affect was normal [Mood] : the mood was normal [Not Anxious] : not anxious [Normal Station and Gait] : the gait and station were normal for the patient's age [No Focal Deficits] : no focal deficits [No Palpable Adenopathy] : no palpable adenopathy

## 2023-01-03 NOTE — HISTORY OF PRESENT ILLNESS
[FreeTextEntry1] : cc retention \par 68 yo male h/o bph \par had knee surgery last week \par was off his tamsulosin presurgery \par has not moved his bowels \par lacy placed last visit

## 2023-01-06 ENCOUNTER — APPOINTMENT (OUTPATIENT)
Dept: UROLOGY | Facility: CLINIC | Age: 68
End: 2023-01-06
Payer: COMMERCIAL

## 2023-01-06 VITALS
WEIGHT: 204 LBS | TEMPERATURE: 97.2 F | BODY MASS INDEX: 27.63 KG/M2 | SYSTOLIC BLOOD PRESSURE: 145 MMHG | DIASTOLIC BLOOD PRESSURE: 74 MMHG | HEIGHT: 72 IN | OXYGEN SATURATION: 99 % | HEART RATE: 66 BPM

## 2023-01-06 PROCEDURE — 99214 OFFICE O/P EST MOD 30 MIN: CPT | Mod: 25

## 2023-01-06 PROCEDURE — 51702 INSERT TEMP BLADDER CATH: CPT

## 2023-01-06 NOTE — PHYSICAL EXAM
[General Appearance - Well Developed] : well developed [General Appearance - Well Nourished] : well nourished [Normal Appearance] : normal appearance [Well Groomed] : well groomed [General Appearance - In No Acute Distress] : no acute distress [Abdomen Soft] : soft [Abdomen Tenderness] : non-tender [Costovertebral Angle Tenderness] : no ~M costovertebral angle tenderness [Urethral Meatus] : meatus normal [Urinary Bladder Findings] : the bladder was normal on palpation [Scrotum] : the scrotum was normal [Testes Mass (___cm)] : there were no testicular masses [FreeTextEntry1] : clear urine in lacy  [Edema] : no peripheral edema [] : no respiratory distress [Respiration, Rhythm And Depth] : normal respiratory rhythm and effort [Exaggerated Use Of Accessory Muscles For Inspiration] : no accessory muscle use [Oriented To Time, Place, And Person] : oriented to person, place, and time [Affect] : the affect was normal [Mood] : the mood was normal [Not Anxious] : not anxious [Normal Station and Gait] : the gait and station were normal for the patient's age [No Focal Deficits] : no focal deficits [No Palpable Adenopathy] : no palpable adenopathy

## 2023-01-06 NOTE — HISTORY OF PRESENT ILLNESS
[FreeTextEntry1] : cc retention \par 68 yo male h/o bph \par had knee surgery last week \par was off his tamsulosin presurgery \par lacy placed last visit

## 2023-01-06 NOTE — ASSESSMENT
[FreeTextEntry1] : failed voiding trial today \par reviewed cic - pt defers and cath replaced \par f/u next week voiding trial \par will cont abx per ortho

## 2023-01-13 ENCOUNTER — APPOINTMENT (OUTPATIENT)
Dept: UROLOGY | Facility: CLINIC | Age: 68
End: 2023-01-13
Payer: COMMERCIAL

## 2023-01-13 VITALS
BODY MASS INDEX: 23.6 KG/M2 | TEMPERATURE: 95.3 F | HEIGHT: 78 IN | OXYGEN SATURATION: 99 % | SYSTOLIC BLOOD PRESSURE: 157 MMHG | WEIGHT: 204 LBS | DIASTOLIC BLOOD PRESSURE: 79 MMHG | HEART RATE: 80 BPM

## 2023-01-13 PROCEDURE — 51702 INSERT TEMP BLADDER CATH: CPT

## 2023-01-13 PROCEDURE — 99213 OFFICE O/P EST LOW 20 MIN: CPT | Mod: 25

## 2023-01-17 ENCOUNTER — RX RENEWAL (OUTPATIENT)
Age: 68
End: 2023-01-17

## 2023-01-20 ENCOUNTER — APPOINTMENT (OUTPATIENT)
Dept: UROLOGY | Facility: CLINIC | Age: 68
End: 2023-01-20
Payer: COMMERCIAL

## 2023-01-20 PROCEDURE — 52000 CYSTOURETHROSCOPY: CPT

## 2023-01-20 PROCEDURE — 99214 OFFICE O/P EST MOD 30 MIN: CPT | Mod: 25

## 2023-01-20 RX ORDER — CEFUROXIME AXETIL 250 MG/1
250 TABLET ORAL
Qty: 30 | Refills: 0 | Status: ACTIVE | COMMUNITY
Start: 2022-12-29 | End: 1900-01-01

## 2023-01-24 ENCOUNTER — APPOINTMENT (OUTPATIENT)
Dept: ELECTROPHYSIOLOGY | Facility: CLINIC | Age: 68
End: 2023-01-24
Payer: COMMERCIAL

## 2023-01-24 ENCOUNTER — NON-APPOINTMENT (OUTPATIENT)
Age: 68
End: 2023-01-24

## 2023-01-24 PROCEDURE — 93298 REM INTERROG DEV EVAL SCRMS: CPT

## 2023-01-24 PROCEDURE — G2066: CPT

## 2023-01-30 ENCOUNTER — OUTPATIENT (OUTPATIENT)
Dept: OUTPATIENT SERVICES | Facility: HOSPITAL | Age: 68
LOS: 1 days | End: 2023-01-30
Payer: COMMERCIAL

## 2023-01-30 VITALS
RESPIRATION RATE: 18 BRPM | HEART RATE: 66 BPM | SYSTOLIC BLOOD PRESSURE: 146 MMHG | TEMPERATURE: 99 F | OXYGEN SATURATION: 98 % | DIASTOLIC BLOOD PRESSURE: 84 MMHG | HEIGHT: 72 IN | WEIGHT: 205.03 LBS

## 2023-01-30 DIAGNOSIS — I10 ESSENTIAL (PRIMARY) HYPERTENSION: ICD-10-CM

## 2023-01-30 DIAGNOSIS — Z98.890 OTHER SPECIFIED POSTPROCEDURAL STATES: ICD-10-CM

## 2023-01-30 DIAGNOSIS — R33.9 RETENTION OF URINE, UNSPECIFIED: ICD-10-CM

## 2023-01-30 DIAGNOSIS — Z01.818 ENCOUNTER FOR OTHER PREPROCEDURAL EXAMINATION: ICD-10-CM

## 2023-01-30 LAB
APPEARANCE UR: CLEAR — SIGNIFICANT CHANGE UP
BACTERIA # UR AUTO: ABNORMAL /HPF
BILIRUB UR-MCNC: NEGATIVE — SIGNIFICANT CHANGE UP
BLD GP AB SCN SERPL QL: SIGNIFICANT CHANGE UP
COLOR SPEC: YELLOW — SIGNIFICANT CHANGE UP
COMMENT - URINE: SIGNIFICANT CHANGE UP
DIFF PNL FLD: ABNORMAL
EPI CELLS # UR: SIGNIFICANT CHANGE UP /HPF
GLUCOSE UR QL: NEGATIVE — SIGNIFICANT CHANGE UP
KETONES UR-MCNC: NEGATIVE — SIGNIFICANT CHANGE UP
LEUKOCYTE ESTERASE UR-ACNC: ABNORMAL
NITRITE UR-MCNC: NEGATIVE — SIGNIFICANT CHANGE UP
PH UR: 7 — SIGNIFICANT CHANGE UP (ref 5–8)
PROT UR-MCNC: 15
RBC CASTS # UR COMP ASSIST: ABNORMAL /HPF (ref 0–2)
SP GR SPEC: 1.01 — SIGNIFICANT CHANGE UP (ref 1.01–1.02)
UROBILINOGEN FLD QL: NEGATIVE — SIGNIFICANT CHANGE UP
WBC UR QL: ABNORMAL /HPF (ref 0–5)

## 2023-01-30 PROCEDURE — G0463: CPT

## 2023-01-30 RX ORDER — APIXABAN 2.5 MG/1
1 TABLET, FILM COATED ORAL
Qty: 0 | Refills: 0 | DISCHARGE

## 2023-01-30 RX ORDER — ACYCLOVIR SODIUM 500 MG
1 VIAL (EA) INTRAVENOUS
Qty: 0 | Refills: 0 | DISCHARGE

## 2023-01-30 NOTE — H&P PST ADULT - PROBLEM SELECTOR PLAN 3
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  Device checked remotely on 1/24/2023 did not reveal any episodes or events, most recent rhythm strip reveals sinus rhythm.  Pt to follow-up with provider for management and remote monthly transmissions.  See attached report on chart.

## 2023-01-30 NOTE — H&P PST ADULT - HISTORY OF PRESENT ILLNESS
This is a old male with PMH of presents with c/o nocturia and increased urinary frequency due to enlarged prostate.Pt for cystoscopy, transurethral resection of prostate  This is a 67 [facility] with PMH of Hypertension on Amlodipine, atrial fibrillation, tachy-kamilah syndrome and syncope-s/p [employer] ILR implantation on 1/29/2020-Model LNQ11-(device checked remotely on 1/24/2023 did not reveal any episodes or events, most recent rhythm strip reveals sinus rhythm) presents with c/o urinary retention due to enlarged prostate. Teran catheter was inserted for relief. Pt is scheduled for cystoscopy, transurethral resection of prostate on 2/6/2023.

## 2023-01-30 NOTE — H&P PST ADULT - GENITOURINARY COMMENTS
Teran catheter to leg bag draining yellow urine Teran catheter in place due to urinary retention due to enlarged prostate

## 2023-01-30 NOTE — H&P PST ADULT - PROBLEM SELECTOR PLAN 2
Instructed to continue antihypertensive meds and take them with sips of water on day of surgery.    Follow-up with PCP postop for management.

## 2023-01-30 NOTE — H&P PST ADULT - CARDIOVASCULAR COMMENTS
HTN, h/o atrial fibrillation, tachy-kamilah syndrome and syndrome-s/p [employer] ILR implantation on 1/29/2020-Model LNQ11-device checked on 1/24/2023

## 2023-01-30 NOTE — HISTORY OF PRESENT ILLNESS
[FreeTextEntry1] : cc retention \par 66 yo male h/o bph \par had knee surgery \par was off his tamsulosin presurgery \par went into [place]

## 2023-01-30 NOTE — H&P PST ADULT - NSICDXFAMHXNEG_GEN_ALL
of patient/asthma/atrial fibrillation/coronary disease/diabetes/heart disease/hypertension/stroke/thyroid disease

## 2023-01-30 NOTE — H&P PST ADULT - ASSESSMENT
This is a 67 year old [facility] [place] male with PMH of Hypertension on Amlodipine, atrial fibrillation, tachy-kamilah syndrome and syncope-s/p Med[employer] ILR implantation on 1/29/2020-Model LNQ11-device checked remotely on 1/24/2023 did not reveal any episodes or events, most recent rhythm strip reveals sinus rhythm) presents with urinary retention. Pt is scheduled for cystoscopy, transurethral resection of prostate on 2/6/2023.  MARTHA stop bang score 3. Pt denies history of MARTHA, never did sleep study.

## 2023-01-30 NOTE — H&P PST ADULT - SKIN
warm and dry/color normal/normal/no rashes/no ulcers warm and dry/color normal/normal/no rashes/no ulcers/scars

## 2023-01-30 NOTE — H&P PST ADULT - BSA (M2)
Per MD's LOS:  Check-out Comments: 1. MD, lab (cmp, cbc, ldh, mag, uric acid), Obinutuzumab on 12/29  Standing labs in epic. Scheduled for 12/29 lab at 810, MD at 850, Tx at 910.        2.15

## 2023-01-30 NOTE — H&P PST ADULT - NS MD HP INPLANTS MED DEV
Teran catheter to leg bag [employer] Loop Recorder [id number] 1/29/2020-last checked 1/24/2023; Teran catheter to leg bag

## 2023-01-30 NOTE — H&P PST ADULT - NSANTHGENDERRD_ENT_A_CORE
Take 1 amoxil 3x daily for 7d then go down to 1 daily.  If symptoms recur, take it 3x daily for 7 days again.  Increase water.    If you have concerns or questions, please do not hesitate to call.  If you have any questions, please do not hesitate to call.  You can reach us at [phone] Monday through Friday 7 a.m. to 6:30 p.m., Saturday 9 a.m. to 4:30 p.m. and Sunday 9 a.m to 2:30 p.m.  Or call  Dr. Montgomery.    Thank you for using the [facility]!    FARIDEH Dominique, CNP, FNP-BC [facility]    To rate your experience with [facility], click on the link below:      [email or web address]       
Yes

## 2023-01-30 NOTE — H&P PST ADULT - PROBLEM SELECTOR PLAN 1
Pt is scheduled for cystoscopy, transurethral resection of prostate on 2/6/2023.  MARTHA stop bang score 3. Pt denies history of MARTHA, never did sleep study.   Preoperative instructions discussed with pt and given to pt. Instructed pt to notify security when he arrives in the lobby of the hospital that he is here for surgery, that he will need someone to come to the hospital to pick him up after surgery, not to eat or drink anything after midnight the night before the surgery, to avoid NSAIDs such as Ibuprofen, Motrin, Aleve, Advil, naproxen before surgery, to take Tylenol if needed for pain, to report if he has been exposed to anyone with any contagious diseases including Covid-19 and Monkeypox or if he is exhibiting any symptoms of COVID-19 or has any rash or if he is exhibiting any symptoms of COVID-19,  to keep appointment for COVID-19  test 3 days before surgery.    Instructed about use of Chlorhexidine 4% soap for 3 days before surgery including the morning of the surgery to prevent infection. Verbalized understanding of instructions given.

## 2023-01-30 NOTE — H&P PST ADULT - NSICDXPASTMEDICALHX_GEN_ALL_CORE_FT
PAST MEDICAL HISTORY:  Benign prostatic hyperplasia without lower urinary tract symptoms     Essential hypertension     Hepatitis C virus infection resolved after antiviral drug therapy     History of loop recorder     PAF (paroxysmal atrial fibrillation)     SSS (sick sinus syndrome)     Type 2 diabetes mellitus without complication, without long-term current use of insulin

## 2023-01-31 LAB
CULTURE RESULTS: SIGNIFICANT CHANGE UP
CULTURE RESULTS: SIGNIFICANT CHANGE UP
SPECIMEN SOURCE: SIGNIFICANT CHANGE UP

## 2023-02-05 ENCOUNTER — TRANSCRIPTION ENCOUNTER (OUTPATIENT)
Age: 68
End: 2023-02-05

## 2023-02-06 ENCOUNTER — APPOINTMENT (OUTPATIENT)
Dept: UROLOGY | Facility: HOSPITAL | Age: 68
End: 2023-02-06

## 2023-02-06 ENCOUNTER — TRANSCRIPTION ENCOUNTER (OUTPATIENT)
Age: 68
End: 2023-02-06

## 2023-02-06 ENCOUNTER — OUTPATIENT (OUTPATIENT)
Dept: OUTPATIENT SERVICES | Facility: HOSPITAL | Age: 68
LOS: 1 days | End: 2023-02-06
Payer: COMMERCIAL

## 2023-02-06 VITALS
HEART RATE: 77 BPM | DIASTOLIC BLOOD PRESSURE: 90 MMHG | TEMPERATURE: 97 F | WEIGHT: 205.03 LBS | OXYGEN SATURATION: 100 % | RESPIRATION RATE: 16 BRPM | HEIGHT: 72 IN | SYSTOLIC BLOOD PRESSURE: 164 MMHG

## 2023-02-06 VITALS
RESPIRATION RATE: 16 BRPM | OXYGEN SATURATION: 100 % | SYSTOLIC BLOOD PRESSURE: 154 MMHG | HEART RATE: 64 BPM | TEMPERATURE: 98 F | DIASTOLIC BLOOD PRESSURE: 85 MMHG

## 2023-02-06 DIAGNOSIS — R33.9 RETENTION OF URINE, UNSPECIFIED: ICD-10-CM

## 2023-02-06 LAB — BLD GP AB SCN SERPL QL: SIGNIFICANT CHANGE UP

## 2023-02-06 PROCEDURE — 52601 PROSTATECTOMY (TURP): CPT

## 2023-02-06 PROCEDURE — 86901 BLOOD TYPING SEROLOGIC RH(D): CPT

## 2023-02-06 PROCEDURE — 36415 COLL VENOUS BLD VENIPUNCTURE: CPT

## 2023-02-06 PROCEDURE — C1769: CPT

## 2023-02-06 PROCEDURE — 86850 RBC ANTIBODY SCREEN: CPT

## 2023-02-06 PROCEDURE — 82962 GLUCOSE BLOOD TEST: CPT

## 2023-02-06 PROCEDURE — 86900 BLOOD TYPING SEROLOGIC ABO: CPT

## 2023-02-06 PROCEDURE — 88305 TISSUE EXAM BY PATHOLOGIST: CPT

## 2023-02-06 PROCEDURE — 88305 TISSUE EXAM BY PATHOLOGIST: CPT | Mod: 26

## 2023-02-06 DEVICE — GUIDEWIRE SENSOR DUAL-FLEX NITINOL STRAIGHT .035" X 150CM: Type: IMPLANTABLE DEVICE | Status: FUNCTIONAL

## 2023-02-06 RX ORDER — FENTANYL CITRATE 50 UG/ML
50 INJECTION INTRAVENOUS
Refills: 0 | Status: DISCONTINUED | OUTPATIENT
Start: 2023-02-06 | End: 2023-02-06

## 2023-02-06 RX ORDER — FENTANYL CITRATE 50 UG/ML
25 INJECTION INTRAVENOUS
Refills: 0 | Status: DISCONTINUED | OUTPATIENT
Start: 2023-02-06 | End: 2023-02-06

## 2023-02-06 RX ORDER — SODIUM CHLORIDE 9 MG/ML
1000 INJECTION, SOLUTION INTRAVENOUS
Refills: 0 | Status: DISCONTINUED | OUTPATIENT
Start: 2023-02-06 | End: 2023-02-06

## 2023-02-06 RX ORDER — ONDANSETRON 8 MG/1
4 TABLET, FILM COATED ORAL EVERY 6 HOURS
Refills: 0 | Status: DISCONTINUED | OUTPATIENT
Start: 2023-02-06 | End: 2023-02-20

## 2023-02-06 RX ORDER — SODIUM CHLORIDE 9 MG/ML
3 INJECTION INTRAMUSCULAR; INTRAVENOUS; SUBCUTANEOUS EVERY 8 HOURS
Refills: 0 | Status: DISCONTINUED | OUTPATIENT
Start: 2023-02-06 | End: 2023-02-06

## 2023-02-06 RX ORDER — ONDANSETRON 8 MG/1
4 TABLET, FILM COATED ORAL ONCE
Refills: 0 | Status: DISCONTINUED | OUTPATIENT
Start: 2023-02-06 | End: 2023-02-06

## 2023-02-06 RX ORDER — ACETAMINOPHEN 500 MG
650 TABLET ORAL EVERY 6 HOURS
Refills: 0 | Status: DISCONTINUED | OUTPATIENT
Start: 2023-02-06 | End: 2023-02-20

## 2023-02-06 RX ORDER — KETOROLAC TROMETHAMINE 30 MG/ML
15 SYRINGE (ML) INJECTION EVERY 6 HOURS
Refills: 0 | Status: DISCONTINUED | OUTPATIENT
Start: 2023-02-06 | End: 2023-02-06

## 2023-02-06 RX ORDER — CIPROFLOXACIN LACTATE 400MG/40ML
1 VIAL (ML) INTRAVENOUS
Qty: 10 | Refills: 0
Start: 2023-02-06 | End: 2023-02-10

## 2023-02-06 RX ADMIN — Medication 15 MILLIGRAM(S): at 15:44

## 2023-02-06 RX ADMIN — FENTANYL CITRATE 50 MICROGRAM(S): 50 INJECTION INTRAVENOUS at 14:45

## 2023-02-06 RX ADMIN — Medication 15 MILLIGRAM(S): at 15:59

## 2023-02-06 RX ADMIN — FENTANYL CITRATE 50 MICROGRAM(S): 50 INJECTION INTRAVENOUS at 14:59

## 2023-02-06 NOTE — BRIEF OPERATIVE NOTE - OPERATION/FINDINGS
Cystoscopy with transurethral resection of prostate. EBL was about 25. 24Fr 3 way left in place on CBI to PACU.

## 2023-02-06 NOTE — ASU DISCHARGE PLAN (ADULT/PEDIATRIC) - ACTIVITY LEVEL
Pulmonary Consult Note      Reason for Consult:  acute on chronic resp failure, viral pneumonia  Requesting Physician: Dorinda Finch MD    Subjective:   CHIEF COMPLAINT :SOB    Patient Active Problem List    Diagnosis Date Noted    Uncontrolled pain     Generalized weakness     Gait disturbance     Myoclonus     Near syncope     Moderate protein-calorie malnutrition ([address]     Traumatic closed nondisplaced fracture of distal end of right fibula, initial encounter 01/11/2022    Asterixis     Hypercapnia     Chronic rhinitis 01/05/2022    Fall at home, initial encounter 01/05/2022    COPD with acute exacerbation ([address] 07/09/2021    Acute exacerbation of chronic obstructive pulmonary disease (COPD) ([address] 06/26/2021    Acute on chronic respiratory failure with hypoxia and hypercapnia ([address] 05/23/2021    COVID-19 05/23/2021    Pulmonary emphysema ([address] 05/18/2021    SOB (shortness of breath) 05/18/2021    Leg swelling 05/18/2021    Chronic hepatitis C without hepatic coma ([address] 05/17/2021     Overview Note:     Patient saw GI specialists and cleared it on her own      Gastroesophageal reflux disease without esophagitis 05/17/2021    H/O drug abuse ([address] 05/17/2021     Overview Note:     Prior heroine      Pulmonary nodules 05/17/2021     Overview Note:     Needs f/u 11/2021      ADHD (attention deficit hyperactivity disorder) 05/17/2021    Pneumonia 05/02/2021    Acute respiratory failure with hypercapnia ([address] 09/24/2020    Chronic respiratory failure ([address] 08/16/2017    COPD, severe ([address] 08/16/2017    Obstructive sleep apnea 08/16/2017     Overview Note:     Uses biPAP at night      S/P placement of cardiac pacemaker 2005     Overview Note:     Has never had replacement battery. HPI:                The patient is a 63 y.o. female with significant past medical history of ADHD, OUD, KING, Obesity, and Hepatitis, COPD-40 pk yr smoking  presents with complaints of SOB x 3 days. She is on 2 L/min of oxygen at home. She has no fever, dry cough, no sick exposure, no recent travel,no headache, no n/v, no abd pain, no diarrhea, no dysuria. Her CTA showed no PE but has bilateral interstitial opacities. Her PBNPT is normal, EF is normal but LV is dilated. She has Human Metapneumovirus positive pneumonia. She has patchy nodular opacities bilateral lung. She is lying in the bed. She is in mild resp distress. Past Medical History:      Diagnosis Date    ADHD 2006    [facility]     COVID-19     Drug addiction in remission ([facility]     recovering addict    Hep C w/o coma, chronic (HCC)     Pacemaker     Sleep apnea     TIA (transient ischemic attack)     per patient \"several mini strokes\"      Past Surgical History:        Procedure Laterality Date   [facility] CHOLECYSTECTOMY  2010    done in [address] [place] [address]  2005     Current Medications:     guaiFENesin  600 mg Oral BID    insulin lispro  0-6 Units SubCUTAneous TID WC    insulin lispro  0-3 Units SubCUTAneous Nightly    aspirin  81 mg Oral Daily    traZODone  300 mg Oral Nightly    budesonide-formoterol  2 puff Inhalation BID    sennosides-docusate sodium  1 tablet Oral BID    pregabalin  100 mg Oral TID    sodium chloride flush  5-40 mL IntraVENous 2 times per day    enoxaparin  40 mg SubCUTAneous Daily    methylPREDNISolone  40 mg IntraVENous Q6H    Followed by   Shahida Urias ON 2/8/2022] predniSONE  40 mg Oral Daily    buprenorphine-naloxone  1 Film SubLINGual BID    amphetamine-dextroamphetamine  30 mg Oral Daily    albuterol sulfate HFA  2 puff Inhalation BID    ipratropium  2 puff Inhalation BID     Allergies:    Social History:    TOBACCO:   reports that she quit smoking about 5 years ago. She has a 40.00 pack-year smoking history. She has never used smokeless tobacco.  ETOH:   reports previous alcohol use.   Patient currently lives independently    Family History:       Problem Relation Age of Onset   [place] Cancer Mother         unsure of type    Thyroid Cancer Mother     Alcohol Abuse Father     Other Sister         passed in car accident    Lupus Brother     Coronary Art Dis Brother        REVIEW OF SYSTEMS:    CONSTITUTIONAL:  negative for fevers, chills, diaphoresis, activity change, appetite change, fatigue, night sweats and unexpected weight change. EYES:  negative for blurred vision, eye discharge, visual disturbance and icterus  HEENT:  negative for hearing loss, tinnitus, ear drainage, sinus pressure, nasal congestion, epistaxis and snoring  RESPIRATORY:  See HPI  CARDIOVASCULAR:  negative for chest pain, palpitations, exertional chest pressure/discomfort, edema, syncope  GASTROINTESTINAL:  negative for nausea, vomiting, diarrhea, constipation, blood in stool and abdominal pain  GENITOURINARY:  negative for frequency, dysuria, urinary incontinence, decreased urine volume, and hematuria  HEMATOLOGIC/LYMPHATIC:  negative for easy bruising, bleeding and lymphadenopathy  ALLERGIC/IMMUNOLOGIC:  negative for recurrent infections, angioedema, anaphylaxis and drug reactions  ENDOCRINE:  negative for weight changes and diabetic symptoms including polyuria, polydipsia and polyphagia  MUSCULOSKELETAL:  negative for  pain, joint swelling, decreased range of motion and muscle weakness  NEUROLOGICAL:  negative for headaches, slurred speech, unilateral weakness  PSYCHIATRIC/BEHAVIORAL: negative for hallucinations, behavioral problems, confusion and agitation.      Objective:   PHYSICAL EXAM:      VITALS:  BP (!) 95/57   Pulse 80   Temp 96.8 °F (36 °C) (Axillary)   Resp 26   Ht 5' 4\" (1.626 m)   Wt 201 lb 4.5 oz (91.3 kg)   SpO2 92%   BMI 34.55 kg/m²   24HR INTAKE/OUTPUT:      Intake/Output Summary (Last 24 hours) at 2/7/2022 1101  Last data filed at 2/7/2022 0943  Gross per 24 hour   Intake 10 ml   Output 400 ml   Net -390 ml     CURRENT PULSE OXIMETRY:  SpO2: 92 %  24HR PULSE OXIMETRY RANGE:  SpO2  Av.5 %  Min: 85 %  Max: 99 %    CONSTITUTIONAL:  awake, alert, cooperative, no apparent distress, and appears stated age  NECK:  Supple, symmetrical, trachea midline, no adenopathy, thyroid symmetric, not enlarged and no tenderness, skin normal  LUNGS:  Occasional basal crackles  CARDIOVASCULAR:  normal S1 and S2, no edema and no JVD  ABDOMEN:  normal bowel sounds, non-distended and no masses palpated, and no tenderness to palpation. No hepatospleenomegaly  LYMPHADENOPATHY:  no axillary or supraclavicular adenopathy. No cervical adnenopathy  PSYCHIATRIC: Oriented to person place and time. No obvious depression or anxiety. MUSCULOSKELETAL: No obvious misalignment or effusion of the joints. No clubbing, cyanosis of the digits. SKIN:  normal skin color, texture, turgor and no redness, warmth, or swelling.  No palpable nodules    DATA:    Old records have been reviewed  CBC with Differential:    Lab Results   Component Value Date    WBC 8.0 2022    RBC 4.27 2022    HGB 11.5 2022    HCT 39.7 2022     2022    MCV 93.0 2022    MCH 26.9 2022    MCHC 29.0 2022    RDW 13.1 2022    SEGSPCT 65.8 2022    LYMPHOPCT 23.8 2022    MONOPCT 9.3 2022    BASOPCT 0.2 2022    MONOSABS 1.6 2022    LYMPHSABS 4.1 2022    EOSABS 0.1 2022    BASOSABS 0.0 2022    DIFFTYPE AUTOMATED DIFFERENTIAL 2022     BMP:    Lab Results   Component Value Date     2022    K 3.5 2022    CL 90 2022    CO2 35 2022    BUN 8 2022    CREATININE 0.4 2022    CALCIUM 8.6 2022    GFRAA >60 2022    LABGLOM >60 2022    GLUCOSE 207 2022     Hepatic Function Panel:    Lab Results   Component Value Date    ALKPHOS 119 2022    ALT 16 2022    AST 32 2022    PROT 6.8 2022    BILITOT 0.4 2022    BILIDIR 0.2 04/14/2021    IBILI 0.0 04/14/2021     ABG:    Lab Results   Component Value Date    WYJ0OAO 44.4 02/06/2022    WGV9VLR 75.0 02/06/2022    PO2ART 169 02/06/2022       Cultures:   Pending    Radiology Review:   Diffuse interstitial opacities with cephalization of the pulmonary   vasculature.  Findings suggest interstitial pulmonary edema versus atypical   pneumonia.         Respiratory motion artifact limits assessment of the lung parenchyma and   pulmonary arteries.  Within this limitation, no pulmonary embolism is   identified to the segmental level.  No evidence of right heart strain.       Patchy nodular opacities throughout both lungs appears to have increased from   the prior chest CT.  This could represent an infectious/inflammatory etiology.       Similar bilateral hilar adenopathy. Douglas Kwan increased mediastinal adenopathy           Assessment/Plan       Patient Active Problem List    Diagnosis Date Noted    Uncontrolled pain     Generalized weakness     Gait disturbance     Myoclonus     Near syncope     Moderate protein-calorie malnutrition ([address]     Traumatic closed nondisplaced fracture of distal end of right fibula, initial encounter 01/11/2022    Asterixis     Hypercapnia     Chronic rhinitis 01/05/2022    Fall at home, initial encounter 01/05/2022    COPD with acute exacerbation ([address] 07/09/2021    Acute exacerbation of chronic obstructive pulmonary disease (COPD) ([address] 06/26/2021    Acute on chronic respiratory failure with hypoxia and hypercapnia ([address] 05/23/2021    COVID-19 05/23/2021    Pulmonary emphysema ([address] 05/18/2021    SOB (shortness of breath) 05/18/2021    Leg swelling 05/18/2021    Chronic hepatitis C without hepatic coma ([address] 05/17/2021     Overview Note:     Patient saw GI specialists and cleared it on her own      Gastroesophageal reflux disease without esophagitis 05/17/2021    H/O drug abuse ([address] 05/17/2021     Overview Note:     Prior heroine      Pulmonary nodules 05/17/2021     Overview Note:     Needs f/u 11/2021      ADHD (attention deficit hyperactivity disorder) 05/17/2021    Pneumonia 05/02/2021    Acute respiratory failure with hypercapnia ([facility] 09/24/2020    Chronic respiratory failure ([facility] 08/16/2017    COPD, severe ([facility] 08/16/2017    Obstructive sleep apnea 08/16/2017     Overview Note:     Uses biPAP at night      S/P placement of cardiac pacemaker 2005     Overview Note:     Has never had replacement battery. Acute on Chronic Hypoxic Hypercapneic resp failure  Chronic Metabolic Alkalosis  COPD on Home O2  Bilateral Pneumonia sec to Human Metapneumovirus  Bilateral Pulmonary nodules  Obesity  KING    PLAN  1. Inhalers  2. Solumedrol  3. Keep sats > 92%  4. ICS  5. OOB  6. BIPAP qhs and prn  7. OP follow up for the Pulmonary  Nodules  8. Await placement  9.  C/w present management        Electronically signed by Xena Vazquez MD on 2/7/2022 at 11:01 AM No excercise/No heavy lifting/No sports/gym

## 2023-02-06 NOTE — BRIEF OPERATIVE NOTE - NSICDXBRIEFPROCEDURE_GEN_ALL_CORE_FT
PROCEDURES:  Transurethral resection of prostate (TURP) using button electrode with cystoscopy 06-Feb-2023 15:30:20  Nikita Willett

## 2023-02-06 NOTE — ASU DISCHARGE PLAN (ADULT/PEDIATRIC) - NS MD DC FALL RISK RISK
For information on Fall & Injury Prevention, visit: [email or web address] OR  [email or web address] OR  https://www.cdc.gov/steadi/patient.html

## 2023-02-06 NOTE — ASU DISCHARGE PLAN (ADULT/PEDIATRIC) - ASU DC SPECIAL INSTRUCTIONSFT
-After your surgery, it is common to have some blood in the urine.  The urine may appear pink or even red.  If you start to notice thick blood or many blood clots in the urine, call your physician.  Otherwise, drink a lot of fluids in order to dilute the urine well.  If you are unable to urinate or you feel abdominal fullness, sitting in a tub of warm water (sitz bath) may help; if not, call your physician.    -If you have a fever over 101F, call your physician.    -Provided that you are not restricted with fluids by your physician, you should drink 6-8 (8 oz.) glasses of fluid per day.    -You may experience weakness after you go home; this is normal.  You will slowly regain your strength, during which time you may gradually increase your level of activity.  	Do not exercise, lift heavy objects, drive, or resume sexual activity until you are told to do so by your physician.  	You may walk around and even climb stairs.  	Do not allow yourself to become constipated, as straining may cause bleeding.  Take stool softeners (ex. Colace) or a laxative (ex. [place], ExLax) if needed.    -Most patients are discharged home with a few days of antibiotics.  Take these pills as instructed.  If you miss one dose, resume with the next dose and complete the entire course of therapy.    -After you arrive at home, call your Urologist’s office to arrange a follow-up appointment.

## 2023-02-06 NOTE — ASU PATIENT PROFILE, ADULT - FALL HARM RISK - UNIVERSAL INTERVENTIONS
Bed in lowest position, wheels locked, appropriate side rails in place/Call bell, personal items and telephone in reach/Instruct patient to call for assistance before getting out of bed or chair/Non-slip footwear when patient is out of bed/[place] to call system/Physically safe environment - no spills, clutter or unnecessary equipment/Purposeful Proactive Rounding/Room/bathroom lighting operational, light cord in reach

## 2023-02-06 NOTE — ASU DISCHARGE PLAN (ADULT/PEDIATRIC) - CARE PROVIDER_API CALL
Gm Chance)  Urology  [address] [facility] [address], [address]  [place] [address]  Phone: [phone]  Fax: [phone]  Follow Up Time: 1 week

## 2023-02-09 ENCOUNTER — APPOINTMENT (OUTPATIENT)
Dept: UROLOGY | Facility: CLINIC | Age: 68
End: 2023-02-09
Payer: COMMERCIAL

## 2023-02-09 ENCOUNTER — INPATIENT (INPATIENT)
Facility: HOSPITAL | Age: 68
LOS: 4 days | Discharge: HOME | DRG: 862 | End: 2023-02-14
Attending: UROLOGY | Admitting: UROLOGY
Payer: COMMERCIAL

## 2023-02-09 VITALS
DIASTOLIC BLOOD PRESSURE: 48 MMHG | SYSTOLIC BLOOD PRESSURE: 84 MMHG | TEMPERATURE: 100 F | HEIGHT: 72 IN | HEART RATE: 130 BPM | RESPIRATION RATE: 18 BRPM | WEIGHT: 207.01 LBS | OXYGEN SATURATION: 97 %

## 2023-02-09 VITALS
HEIGHT: 78 IN | HEART RATE: 96 BPM | TEMPERATURE: 98.3 F | SYSTOLIC BLOOD PRESSURE: 124 MMHG | OXYGEN SATURATION: 97 % | DIASTOLIC BLOOD PRESSURE: 75 MMHG | BODY MASS INDEX: 23.6 KG/M2 | WEIGHT: 204 LBS

## 2023-02-09 DIAGNOSIS — A41.9 SEPSIS, UNSPECIFIED ORGANISM: ICD-10-CM

## 2023-02-09 LAB
ALBUMIN SERPL ELPH-MCNC: 3.2 G/DL — LOW (ref 3.5–5)
ALP SERPL-CCNC: 115 U/L — SIGNIFICANT CHANGE UP (ref 40–120)
ALT FLD-CCNC: 18 U/L DA — SIGNIFICANT CHANGE UP (ref 10–60)
ANION GAP SERPL CALC-SCNC: 6 MMOL/L — SIGNIFICANT CHANGE UP (ref 5–17)
APPEARANCE UR: ABNORMAL
APTT BLD: 28.6 SEC — SIGNIFICANT CHANGE UP (ref 27.5–35.5)
AST SERPL-CCNC: 17 U/L — SIGNIFICANT CHANGE UP (ref 10–40)
BACTERIA # UR AUTO: ABNORMAL /HPF
BASOPHILS # BLD AUTO: 0.03 K/UL — SIGNIFICANT CHANGE UP (ref 0–0.2)
BASOPHILS NFR BLD AUTO: 0.3 % — SIGNIFICANT CHANGE UP (ref 0–2)
BILIRUB SERPL-MCNC: 0.7 MG/DL — SIGNIFICANT CHANGE UP (ref 0.2–1.2)
BILIRUB UR-MCNC: NEGATIVE — SIGNIFICANT CHANGE UP
BUN SERPL-MCNC: 21 MG/DL — HIGH (ref 7–18)
CALCIUM SERPL-MCNC: 9.1 MG/DL — SIGNIFICANT CHANGE UP (ref 8.4–10.5)
CHLORIDE SERPL-SCNC: 97 MMOL/L — SIGNIFICANT CHANGE UP (ref 96–108)
CO2 SERPL-SCNC: 29 MMOL/L — SIGNIFICANT CHANGE UP (ref 22–31)
COLOR SPEC: YELLOW — SIGNIFICANT CHANGE UP
CREAT SERPL-MCNC: 1.05 MG/DL — SIGNIFICANT CHANGE UP (ref 0.5–1.3)
DIFF PNL FLD: ABNORMAL
EGFR: 79 ML/MIN/1.73M2 — SIGNIFICANT CHANGE UP
EOSINOPHIL # BLD AUTO: 0.01 K/UL — SIGNIFICANT CHANGE UP (ref 0–0.5)
EOSINOPHIL NFR BLD AUTO: 0.1 % — SIGNIFICANT CHANGE UP (ref 0–6)
EPI CELLS # UR: SIGNIFICANT CHANGE UP /HPF
FLUAV AG NPH QL: SIGNIFICANT CHANGE UP
FLUBV AG NPH QL: SIGNIFICANT CHANGE UP
GLUCOSE SERPL-MCNC: 157 MG/DL — HIGH (ref 70–99)
GLUCOSE UR QL: NEGATIVE — SIGNIFICANT CHANGE UP
HCT VFR BLD CALC: 33.7 % — LOW (ref 39–50)
HGB BLD-MCNC: 10.8 G/DL — LOW (ref 13–17)
IMM GRANULOCYTES NFR BLD AUTO: 1.3 % — HIGH (ref 0–0.9)
INR BLD: 1.51 RATIO — HIGH (ref 0.88–1.16)
KETONES UR-MCNC: NEGATIVE — SIGNIFICANT CHANGE UP
LACTATE SERPL-SCNC: 1.3 MMOL/L — SIGNIFICANT CHANGE UP (ref 0.7–2)
LEUKOCYTE ESTERASE UR-ACNC: ABNORMAL
LYMPHOCYTES # BLD AUTO: 0.5 K/UL — LOW (ref 1–3.3)
LYMPHOCYTES # BLD AUTO: 4.9 % — LOW (ref 13–44)
MAGNESIUM SERPL-MCNC: 1.9 MG/DL — SIGNIFICANT CHANGE UP (ref 1.6–2.6)
MCHC RBC-ENTMCNC: 29.7 PG — SIGNIFICANT CHANGE UP (ref 27–34)
MCHC RBC-ENTMCNC: 32 GM/DL — SIGNIFICANT CHANGE UP (ref 32–36)
MCV RBC AUTO: 92.6 FL — SIGNIFICANT CHANGE UP (ref 80–100)
MONOCYTES # BLD AUTO: 0.2 K/UL — SIGNIFICANT CHANGE UP (ref 0–0.9)
MONOCYTES NFR BLD AUTO: 2 % — SIGNIFICANT CHANGE UP (ref 2–14)
NEUTROPHILS # BLD AUTO: 9.32 K/UL — HIGH (ref 1.8–7.4)
NEUTROPHILS NFR BLD AUTO: 91.4 % — HIGH (ref 43–77)
NITRITE UR-MCNC: NEGATIVE — SIGNIFICANT CHANGE UP
NRBC # BLD: 0 /100 WBCS — SIGNIFICANT CHANGE UP (ref 0–0)
PH UR: 6.5 — SIGNIFICANT CHANGE UP (ref 5–8)
PHOSPHATE SERPL-MCNC: 1.5 MG/DL — LOW (ref 2.5–4.5)
PLATELET # BLD AUTO: 149 K/UL — LOW (ref 150–400)
POTASSIUM SERPL-MCNC: 3.7 MMOL/L — SIGNIFICANT CHANGE UP (ref 3.5–5.3)
POTASSIUM SERPL-SCNC: 3.7 MMOL/L — SIGNIFICANT CHANGE UP (ref 3.5–5.3)
PROT SERPL-MCNC: 7.7 G/DL — SIGNIFICANT CHANGE UP (ref 6–8.3)
PROT UR-MCNC: 100
PROTHROM AB SERPL-ACNC: 18.1 SEC — HIGH (ref 10.5–13.4)
RBC # BLD: 3.64 M/UL — LOW (ref 4.2–5.8)
RBC # FLD: 13.4 % — SIGNIFICANT CHANGE UP (ref 10.3–14.5)
RBC CASTS # UR COMP ASSIST: >50 /HPF (ref 0–2)
SARS-COV-2 RNA SPEC QL NAA+PROBE: SIGNIFICANT CHANGE UP
SODIUM SERPL-SCNC: 132 MMOL/L — LOW (ref 135–145)
SP GR SPEC: 1.01 — SIGNIFICANT CHANGE UP (ref 1.01–1.02)
UROBILINOGEN FLD QL: NEGATIVE — SIGNIFICANT CHANGE UP
WBC # BLD: 10.19 K/UL — SIGNIFICANT CHANGE UP (ref 3.8–10.5)
WBC # FLD AUTO: 10.19 K/UL — SIGNIFICANT CHANGE UP (ref 3.8–10.5)
WBC UR QL: >50 /HPF (ref 0–5)

## 2023-02-09 PROCEDURE — 99285 EMERGENCY DEPT VISIT HI MDM: CPT

## 2023-02-09 PROCEDURE — 73562 X-RAY EXAM OF KNEE 3: CPT | Mod: 26,RT

## 2023-02-09 PROCEDURE — 99024 POSTOP FOLLOW-UP VISIT: CPT

## 2023-02-09 PROCEDURE — 71045 X-RAY EXAM CHEST 1 VIEW: CPT | Mod: 26

## 2023-02-09 PROCEDURE — 93010 ELECTROCARDIOGRAM REPORT: CPT

## 2023-02-09 PROCEDURE — 93971 EXTREMITY STUDY: CPT | Mod: 26,RT

## 2023-02-09 RX ORDER — HEPARIN SODIUM 5000 [USP'U]/ML
5000 INJECTION INTRAVENOUS; SUBCUTANEOUS EVERY 8 HOURS
Refills: 0 | Status: DISCONTINUED | OUTPATIENT
Start: 2023-02-09 | End: 2023-02-12

## 2023-02-09 RX ORDER — PIPERACILLIN AND TAZOBACTAM 4; .5 G/20ML; G/20ML
3.38 INJECTION, POWDER, LYOPHILIZED, FOR SOLUTION INTRAVENOUS ONCE
Refills: 0 | Status: COMPLETED | OUTPATIENT
Start: 2023-02-09 | End: 2023-02-09

## 2023-02-09 RX ORDER — AMLODIPINE BESYLATE 2.5 MG/1
2.5 TABLET ORAL DAILY
Refills: 0 | Status: DISCONTINUED | OUTPATIENT
Start: 2023-02-09 | End: 2023-02-14

## 2023-02-09 RX ORDER — ACETAMINOPHEN 500 MG
1000 TABLET ORAL ONCE
Refills: 0 | Status: COMPLETED | OUTPATIENT
Start: 2023-02-09 | End: 2023-02-09

## 2023-02-09 RX ORDER — SODIUM CHLORIDE 9 MG/ML
1000 INJECTION INTRAMUSCULAR; INTRAVENOUS; SUBCUTANEOUS
Refills: 0 | Status: DISCONTINUED | OUTPATIENT
Start: 2023-02-09 | End: 2023-02-11

## 2023-02-09 RX ORDER — SODIUM CHLORIDE 9 MG/ML
2000 INJECTION, SOLUTION INTRAVENOUS ONCE
Refills: 0 | Status: COMPLETED | OUTPATIENT
Start: 2023-02-09 | End: 2023-02-09

## 2023-02-09 RX ORDER — PIPERACILLIN AND TAZOBACTAM 4; .5 G/20ML; G/20ML
3.38 INJECTION, POWDER, LYOPHILIZED, FOR SOLUTION INTRAVENOUS EVERY 8 HOURS
Refills: 0 | Status: DISCONTINUED | OUTPATIENT
Start: 2023-02-09 | End: 2023-02-10

## 2023-02-09 RX ADMIN — SODIUM CHLORIDE 130 MILLILITER(S): 9 INJECTION INTRAMUSCULAR; INTRAVENOUS; SUBCUTANEOUS at 22:52

## 2023-02-09 RX ADMIN — Medication 62.5 MILLIMOLE(S): at 19:10

## 2023-02-09 RX ADMIN — Medication 400 MILLIGRAM(S): at 16:12

## 2023-02-09 RX ADMIN — Medication 1000 MILLIGRAM(S): at 21:34

## 2023-02-09 RX ADMIN — SODIUM CHLORIDE 2000 MILLILITER(S): 9 INJECTION, SOLUTION INTRAVENOUS at 16:05

## 2023-02-09 RX ADMIN — PIPERACILLIN AND TAZOBACTAM 200 GRAM(S): 4; .5 INJECTION, POWDER, LYOPHILIZED, FOR SOLUTION INTRAVENOUS at 16:10

## 2023-02-09 RX ADMIN — HEPARIN SODIUM 5000 UNIT(S): 5000 INJECTION INTRAVENOUS; SUBCUTANEOUS at 22:52

## 2023-02-09 RX ADMIN — PIPERACILLIN AND TAZOBACTAM 25 GRAM(S): 4; .5 INJECTION, POWDER, LYOPHILIZED, FOR SOLUTION INTRAVENOUS at 22:52

## 2023-02-09 NOTE — H&P ADULT - NSHPPHYSICALEXAM_GEN_ALL_CORE
PHYSICAL EXAM:  Gen: NAD, resting comfortably, conversive.   Cardiopulm: Non-labored breathing.   Ab: Soft, appropriately tender, nondistended.   : Teran draining clear yellow urine  Ext: Moves all 4 spontaneously. RLE with mild swelling below slightly erythematous mildly tender knee

## 2023-02-09 NOTE — ED PROVIDER NOTE - OBJECTIVE STATEMENT
66 yo M w/ pmhx of HTN, afib, BPH s/p TURP on 2/6/23 p/w acute development of chill, fever, and tremors. Pt reports having and uncomplicated TURP on 2/6 with lacy removal earlier today. Pt reports development of fatigue, fever, chills, and tremors shortly after that have persisted. Pt denies any current CP, SOB, abdominal pain, N/V/D, or difficulty urinating currently. Pt currently feels fatigued. At intake, Pt was found to be hypotensive to 80s/40s, repeat BP was 95/57 at beside, tachycardic and T of 100.2 after taking tylenol at 1530. Denies any hematuria or dysuria. 66 yo M w/ pmhx of HTN, afib, BPH s/p TURP on 2/6/23 p/w acute development of chills & fever this afternoon.     Pt reports having and uncomplicated TURP on 2/6 with lacy removal earlier today at urologist office and claimed he was able to void subsequently. Pt reports development of fatigue, fever, chills, and tremors shortly after that have persisted. Pt denies any current CP, SOB, abdominal pain, N/V/D, or difficulty urinating currently. Pt currently feels fatigued. At intake, Pt was found to be hypotensive to 80s/40s, repeat BP was 95/57 at beside, tachycardic and T of 100.2 after taking tylenol at 1530. Denies any hematuria or dysuria. No chest pain, cough. Of note, patient underwent R TKR in December & has noted residual swelling in knee and leg that has been improving. 68 yo M w/ PMH of HTN, Afib, BPH s/p TURP on 2/6/23 p/w acute development of chills & fever this afternoon.     Pt reports having and uncomplicated TURP on 2/6 with Teran removal earlier today at urologist office and claimed he was able to void subsequently. Pt reports development of fatigue, fever, chills, and tremors shortly after that have persisted. Pt denies any current CP, SOB, abdominal pain, N/V/D, or difficulty urinating currently. Pt currently feels fatigued. At intake, Pt was found to be hypotensive to 80s/40s, repeat BP was 95/57 at beside, tachycardic and T of 100.2 after taking tylenol at 1530. Denies any hematuria or dysuria. No chest pain, cough. Of note, patient underwent R TKR in December & has noted residual swelling in knee and leg that has been improving.

## 2023-02-09 NOTE — H&P ADULT - HISTORY OF PRESENT ILLNESS
68 yo M w/ pmhx of HTN, afib, BPH s/p TURP on 2/6/23 p/w acute development of chills & fever this afternoon.   Pt reports having and uncomplicated TURP on 2/6 with lacy removal earlier today at urologist office and claimed he was able to void subsequently. Pt reports development of fatigue, fever, chills, and tremors shortly after that have persisted. Pt denies any current CP, SOB, abdominal pain, N/V/D, or difficulty urinating currently. Pt currently feels fatigued. At intake, Pt was found to be hypotensive to 80s/40s, repeat BP was 95/57 at beside, tachycardic and T of 100.2 after taking tylenol at 1530. Denies any hematuria or dysuria. No chest pain, cough. Of note, patient underwent R TKR in December & has noted residual swelling in knee and leg that has been improving.    In the Ed, patient was given fluids and zosyn. Slight erythema of the right knee with lower extremity swelling and will be getting knee xray and RLE doppler. 20Fr coude lacy catheter placed. States that he has no pain, nausea or vomiting. Only had chills at home. Cultures sent. No dysuria or hematuria. PVR was 590cc prior to lacy placement.
no...

## 2023-02-09 NOTE — ED PROVIDER NOTE - NS ED ATTENDING STATEMENT MOD
I have seen and examined this patient and fully participated in the care of this patient as the teaching attending.  The service was shared with the DENIS.  I reviewed and verified the documentation and independently performed the documented: Attending with

## 2023-02-09 NOTE — ED ADULT NURSE REASSESSMENT NOTE - NS ED NURSE REASSESS COMMENT FT1
1715pm--MD Tamie Shelton at bedside placing 20FR coude Teran catheter, patent. No acute distress noted.

## 2023-02-09 NOTE — ED PROVIDER NOTE - PHYSICAL EXAMINATION
PHYSICAL EXAM:  GENERAL: NAD, well-groomed, well-developed  HEAD:  Atraumatic, Normocephalic  EYES: EOMI, PERRLA, conjunctiva and sclera clear  ENMT: No tonsillar erythema, exudates, or enlargement; Moist mucous membranes, Good dentition, No lesions  NECK: Supple, No JVD, Normal thyroid  CHEST/LUNG: Clear to percussion bilaterally; No rales, rhonchi, wheezing, or rubs  HEART: Regular rate and rhythm; No murmurs, rubs, or gallops  ABDOMEN: discomfort to palpation to lower abdominal / suprapubic area. Soft, Nontender, Nondistended; Bowel sounds present  VASCULAR:  2+ Peripheral Pulses, No clubbing, cyanosis, or edema  LYMPH: No lymphadenopathy noted  SKIN: No rashes or lesions  NERVOUS SYSTEM:  Alert & Oriented X3, Good concentration; PHYSICAL EXAM:  GENERAL: NAD, well-groomed, well-developed  HEAD:  Atraumatic, Normocephalic  EYES: EOMI, PERRLA, conjunctiva and sclera clear  ENMT: No tonsillar erythema, exudates, or enlargement; Moist mucous membranes, Good dentition, No lesions  NECK: Supple, No JVD, Normal thyroid  CHEST/LUNG: Clear to percussion bilaterally; No rales, rhonchi, wheezing, or rubs  HEART: Regular rate and rhythm; No murmurs, rubs, or gallops  ABDOMEN: discomfort to palpation to lower abdominal / suprapubic area. Soft, Nontender, Nondistended; Bowel sounds present  VASCULAR:  2+ Peripheral Pulses, No clubbing, cyanosis, or edema. RIght knee and calf with swelling. Able to range knee in passive and active ROM. Mild warmth but no erythema.   LYMPH: No lymphadenopathy noted  SKIN: No rashes or lesions  NERVOUS SYSTEM:  Alert & Oriented X3, Good concentration;

## 2023-02-09 NOTE — H&P ADULT - NSHPLABSRESULTS_GEN_ALL_CORE
10.8   10. )-----------( 149      ( 2023 16:00 )             33.7   02-    132<L>  |  97  |  21<H>  ----------------------------<  157<H>  3.7   |  29  |  1.05    Ca    9.1      2023 16:00  Phos  1.5     02-  Mg     1.9     -09    TPro  7.7  /  Alb  3.2<L>  /  TBili  0.7  /  DBili  x   /  AST  17  /  ALT  18  /  AlkPhos  115  02-09  Urinalysis Basic - ( 2023 16:00 )    Color: Yellow / Appearance: Slightly Turbid / S.010 / pH: x  Gluc: x / Ketone: Negative  / Bili: Negative / Urobili: Negative   Blood: x / Protein: 100 / Nitrite: Negative   Leuk Esterase: Moderate / RBC: >50 /HPF / WBC >50 /HPF   Sq Epi: x / Non Sq Epi: Few /HPF / Bacteria: > 10,000 /HPF    Urine culture: pending  Blood culture: pending

## 2023-02-09 NOTE — ED PROVIDER NOTE - DIFFERENTIAL DIAGNOSIS
Sepsis due to recent instrumentation/translocation post TURP. Less likely source is infected right knee prosthesis Differential Diagnosis

## 2023-02-09 NOTE — ED ADULT TRIAGE NOTE - CHIEF COMPLAINT QUOTE
I am getting chills today. S/p TURP 2/6/23 and Teran cathether removed today. Pt voiding without c/o. Tylenol taken at 1530

## 2023-02-09 NOTE — H&P ADULT - ATTENDING COMMENTS
66 yo M w/ pmhx of HTN, afib, BPH s/p TURP on 2/6/23 p/w acute development of chills & fever this afternoon. Temp to 100.2, Tachy, and hypotensive in ED. WBC wnl, Cr wnl. Dirty U/A, cultures pending.  - Admit to Dr. Chance  - IV abx - zosyn  - IVF  - Regular diet  - f/u cultures  - continue lacy catheter  - f/u knee imaging and U/S  - OOB/IS/DVT ppx

## 2023-02-09 NOTE — ED PROVIDER NOTE - PROGRESS NOTE DETAILS
Accepted by urology. Not requesting CT a/p as believe source is urinary due to instrumentation. Accepted by urology. Not requesting CT a/p as believe source is urinary due to instrumentation. Hemodynamically stable.

## 2023-02-09 NOTE — ED PROVIDER NOTE - NS ED ROS FT
General: +fever, fatigue, chills no weakness, no change in weight  HEENT: No blurry vision, no congestion, no rhinorrhea, no ear pain, no throat pain, no odynophagia,   Respiratory: No cough, no shortness of breath  Cardiac: No chest pain, no palpitations  GI: No abdominal pain, no nausea, no vomiting, no constipation, no diarrhea  : No dysuria, no hematuria  MSK: No swelling in extremities, no arthralgias, no back pain  Neuro: No headache, no dizziness  Skin: No rashes

## 2023-02-09 NOTE — ED ADULT NURSE NOTE - OBJECTIVE STATEMENT
Pt s/p TURP surgery x Monday, catheter removed in urology office for follow-up and started having chills, fever. Sent for sepsis workup. No acute distress noted, denies chest pain, no SOB noted.

## 2023-02-09 NOTE — ED PROVIDER NOTE - CLINICAL SUMMARY MEDICAL DECISION MAKING FREE TEXT BOX
68 yo M w/ pmhx of HTN, A-fib, BPH s/p TURP on 2/6/23 now p/w symptoms concerning for sepsis. Most likely source is urinary s/p urological procedure. Will obtain labs and UA, start abx and fluids, and obtain CT A/P to evaluate for possible abscess development. Plan to admit patient for further treatment of sepsis. 66 yo M w/ pmhx of HTN, A-fib, BPH s/p TURP on 23 now p/w symptoms concerning for sepsis. Most likely source is urinary s/p urological procedure. Will obtain labs and UA, start abx and fluids, and obtain CT A/P to evaluate for possible abscess development. Plan to admit patient for further treatment of sepsis.    Att68 y/o M hx of HTN, Afib, BPH s/p TURP on  presenting with infectious symptoms concerning for sepsis  Likely urinary source. Hypotensive in triage- improved with IVF  Will check screening labs, CXR, BCx, UA/UCx, Flu/Covid  IVF. Empiric Treatment with Pip-Tazo   evaluation. Will require admission for further care  Knee swelling noted- not concerning for overt infection of prosthetic or septic joint, but will obtain X-ray, LE duplex to further evaluate swelling.

## 2023-02-09 NOTE — ED ADULT NURSE NOTE - ED STAT RN HANDOFF DETAILS
Report given to KRISTYN Fournier. Pt resting in bed, no acute distress noted, denies chest pain, no SOB noted.

## 2023-02-10 LAB
ANION GAP SERPL CALC-SCNC: 6 MMOL/L — SIGNIFICANT CHANGE UP (ref 5–17)
BUN SERPL-MCNC: 14 MG/DL — SIGNIFICANT CHANGE UP (ref 7–18)
CALCIUM SERPL-MCNC: 8.9 MG/DL — SIGNIFICANT CHANGE UP (ref 8.4–10.5)
CHLORIDE SERPL-SCNC: 104 MMOL/L — SIGNIFICANT CHANGE UP (ref 96–108)
CO2 SERPL-SCNC: 29 MMOL/L — SIGNIFICANT CHANGE UP (ref 22–31)
CREAT SERPL-MCNC: 0.77 MG/DL — SIGNIFICANT CHANGE UP (ref 0.5–1.3)
CRP SERPL-MCNC: 237 MG/L — HIGH
E CLOAC COMP DNA BLD POS QL NAA+PROBE: SIGNIFICANT CHANGE UP
E FAECALIS DNA BLD POS QL NAA+NON-PROBE: SIGNIFICANT CHANGE UP
EGFR: 99 ML/MIN/1.73M2 — SIGNIFICANT CHANGE UP
GLUCOSE SERPL-MCNC: 152 MG/DL — HIGH (ref 70–99)
GRAM STN FLD: SIGNIFICANT CHANGE UP
GRAM STN FLD: SIGNIFICANT CHANGE UP
HCT VFR BLD CALC: 31.2 % — LOW (ref 39–50)
HCV AB S/CO SERPL IA: 6.04 S/CO — HIGH (ref 0–0.99)
HCV AB SERPL-IMP: REACTIVE
HGB BLD-MCNC: 10.1 G/DL — LOW (ref 13–17)
MCHC RBC-ENTMCNC: 30.1 PG — SIGNIFICANT CHANGE UP (ref 27–34)
MCHC RBC-ENTMCNC: 32.4 GM/DL — SIGNIFICANT CHANGE UP (ref 32–36)
MCV RBC AUTO: 92.9 FL — SIGNIFICANT CHANGE UP (ref 80–100)
METHOD TYPE: SIGNIFICANT CHANGE UP
NRBC # BLD: 0 /100 WBCS — SIGNIFICANT CHANGE UP (ref 0–0)
PLATELET # BLD AUTO: 128 K/UL — LOW (ref 150–400)
POTASSIUM SERPL-MCNC: 3.7 MMOL/L — SIGNIFICANT CHANGE UP (ref 3.5–5.3)
POTASSIUM SERPL-SCNC: 3.7 MMOL/L — SIGNIFICANT CHANGE UP (ref 3.5–5.3)
RBC # BLD: 3.36 M/UL — LOW (ref 4.2–5.8)
RBC # FLD: 13.8 % — SIGNIFICANT CHANGE UP (ref 10.3–14.5)
SODIUM SERPL-SCNC: 139 MMOL/L — SIGNIFICANT CHANGE UP (ref 135–145)
SPECIMEN SOURCE: SIGNIFICANT CHANGE UP
SPECIMEN SOURCE: SIGNIFICANT CHANGE UP
WBC # BLD: 7.88 K/UL — SIGNIFICANT CHANGE UP (ref 3.8–10.5)
WBC # FLD AUTO: 7.88 K/UL — SIGNIFICANT CHANGE UP (ref 3.8–10.5)

## 2023-02-10 RX ORDER — TAMSULOSIN HYDROCHLORIDE 0.4 MG/1
0.4 CAPSULE ORAL AT BEDTIME
Refills: 0 | Status: DISCONTINUED | OUTPATIENT
Start: 2023-02-10 | End: 2023-02-14

## 2023-02-10 RX ORDER — FINASTERIDE 5 MG/1
5 TABLET, FILM COATED ORAL DAILY
Refills: 0 | Status: DISCONTINUED | OUTPATIENT
Start: 2023-02-10 | End: 2023-02-14

## 2023-02-10 RX ORDER — MEROPENEM 1 G/30ML
1000 INJECTION INTRAVENOUS EVERY 8 HOURS
Refills: 0 | Status: DISCONTINUED | OUTPATIENT
Start: 2023-02-10 | End: 2023-02-14

## 2023-02-10 RX ORDER — ACETAMINOPHEN 500 MG
650 TABLET ORAL EVERY 6 HOURS
Refills: 0 | Status: DISCONTINUED | OUTPATIENT
Start: 2023-02-10 | End: 2023-02-14

## 2023-02-10 RX ORDER — ACYCLOVIR SODIUM 500 MG
400 VIAL (EA) INTRAVENOUS DAILY
Refills: 0 | Status: DISCONTINUED | OUTPATIENT
Start: 2023-02-10 | End: 2023-02-14

## 2023-02-10 RX ORDER — KETOROLAC TROMETHAMINE 30 MG/ML
30 SYRINGE (ML) INJECTION ONCE
Refills: 0 | Status: COMPLETED | OUTPATIENT
Start: 2023-02-10 | End: 2023-02-10

## 2023-02-10 RX ADMIN — SODIUM CHLORIDE 130 MILLILITER(S): 9 INJECTION INTRAMUSCULAR; INTRAVENOUS; SUBCUTANEOUS at 22:25

## 2023-02-10 RX ADMIN — HEPARIN SODIUM 5000 UNIT(S): 5000 INJECTION INTRAVENOUS; SUBCUTANEOUS at 06:05

## 2023-02-10 RX ADMIN — TAMSULOSIN HYDROCHLORIDE 0.4 MILLIGRAM(S): 0.4 CAPSULE ORAL at 21:26

## 2023-02-10 RX ADMIN — SODIUM CHLORIDE 130 MILLILITER(S): 9 INJECTION INTRAMUSCULAR; INTRAVENOUS; SUBCUTANEOUS at 14:21

## 2023-02-10 RX ADMIN — HEPARIN SODIUM 5000 UNIT(S): 5000 INJECTION INTRAVENOUS; SUBCUTANEOUS at 21:26

## 2023-02-10 RX ADMIN — FINASTERIDE 5 MILLIGRAM(S): 5 TABLET, FILM COATED ORAL at 12:15

## 2023-02-10 RX ADMIN — Medication 400 MILLIGRAM(S): at 12:14

## 2023-02-10 RX ADMIN — SODIUM CHLORIDE 130 MILLILITER(S): 9 INJECTION INTRAMUSCULAR; INTRAVENOUS; SUBCUTANEOUS at 21:26

## 2023-02-10 RX ADMIN — Medication 650 MILLIGRAM(S): at 20:49

## 2023-02-10 RX ADMIN — AMLODIPINE BESYLATE 2.5 MILLIGRAM(S): 2.5 TABLET ORAL at 06:05

## 2023-02-10 RX ADMIN — PIPERACILLIN AND TAZOBACTAM 25 GRAM(S): 4; .5 INJECTION, POWDER, LYOPHILIZED, FOR SOLUTION INTRAVENOUS at 06:05

## 2023-02-10 RX ADMIN — MEROPENEM 100 MILLIGRAM(S): 1 INJECTION INTRAVENOUS at 21:26

## 2023-02-10 RX ADMIN — HEPARIN SODIUM 5000 UNIT(S): 5000 INJECTION INTRAVENOUS; SUBCUTANEOUS at 14:15

## 2023-02-10 RX ADMIN — PIPERACILLIN AND TAZOBACTAM 25 GRAM(S): 4; .5 INJECTION, POWDER, LYOPHILIZED, FOR SOLUTION INTRAVENOUS at 14:14

## 2023-02-10 NOTE — PROGRESS NOTE ADULT - ASSESSMENT
d 66 yo M w/ pmhx of HTN, afib, BPH s/p TURP on 2/6/23 p/w acute development of chills & fever this afternoon. Temp to 100.2, Tachy, and hypotensive in ED. WBC wnl, Cr wnl. Dirty U/A, cultures pending.  - AM labs reviewed  - IV abx - zosyn  - IVF  - Regular diet  - f/u cultures  - continue lacy catheter  - Knee imaging and RLE U/S reviewed - negative  - OOB/IS/DVT ppx

## 2023-02-10 NOTE — PROVIDER CONTACT NOTE (CRITICAL VALUE NOTIFICATION) - SITUATION
called by [facility] pt blood culture result collected 2/9/23 collection a growth in aerobic and anaerobic gram negative rods and gram positive cocci in pairs; another set of collection from same date growth in aerobic and anaerobic bottles gram negative rods.

## 2023-02-10 NOTE — PHARMACOTHERAPY INTERVENTION NOTE - COMMENTS
66 y/o M is on Zosyn empirically for pylo, as biofire came back positive for Enterobacter cloacae complex and Enterococcus faecalis (in blood), the recommendation was to get ID consult (though mentioned on note PEGGY Banuelos is not following patient) and change regimen to cover pathogens (Ampicillin plus Fluoroquinolone, etc.)

## 2023-02-10 NOTE — PROGRESS NOTE ADULT - SUBJECTIVE AND OBJECTIVE BOX
Subjective  Patient seen and examined. No acute complaints.  Denies fevers, chills, nausea, vomiting, SOB, CP.  Tolerating diet.    Objective    Vital signs  T(F): , Max: 100.2 (02-09-23 @ 14:52)  HR: 72 (02-10-23 @ 05:16)  BP: 130/75 (02-10-23 @ 05:16)  SpO2: 97% (02-10-23 @ 05:16)  Wt(kg): --    Output     OUT:    Indwelling Catheter - Urethral (mL): 1200 mL  Total OUT: 1200 mL    Total NET: -1200 mL    Gen: NAD  Abd: soft, nontender, nondistended  : lacy secured in place, draining CYU  Ext: Moves all 4 spontaneously. RLE with mild swelling below slightly erythematous mildly tender knee    Labs      02-10 @ 06:55    WBC 7.88  / Hct 31.2  / SCr 0.77     02-09 @ 16:00    WBC 10.19 / Hct 33.7  / SCr 1.05     Urine Cx: pending  Blood Cx: pending    Imaging  < from: US Duplex Venous Lower Ext Ltd, Right (02.09.23 @ 17:49) >    ACC: [id number] EXAM:  US DPLX LWR EXT VEINS LTD RT   ORDERED BY:   AGNES WHALEY     PROCEDURE DATE:  02/09/2023          INTERPRETATION:  CLINICAL INFORMATION: Right leg swelling    COMPARISON: None available.    TECHNIQUE: Duplex sonographyof the RIGHT LOWER extremity veins with   color and spectral Doppler, with and without compression.    FINDINGS:    There is normal compressibility of the right common femoral, femoral and   popliteal veins.  The contralateral common femoral vein is patent.  Doppler examination shows normal spontaneous and phasic flow.    No calf vein thrombosis is detected.    IMPRESSION:  No evidence of right lower extremity deep venous thrombosis.          --- End of Report ---            WALLACE CADET MD; Attending Radiologist  This document has been electronically signed. Feb 9 2023  5:51PM    < end of copied text >  < from: Xray Knee 3 Views, Right (02.09.23 @ 17:38) >    ACC: [id number] EXAM:  XR KNEE AP LAT OBL 3 VIEWS RT   ORDERED BY:   AGNES WHALEY     PROCEDURE DATE:  02/09/2023          INTERPRETATION:  3 views of the right knee were obtained for history of   postoperative swelling following knee replacement.    There are no prior studies for comparison.    The patient is undergone right knee arthroplasty, with the implants   showing good anatomic alignment and no sign of hardware loosening or   displacement. There is no fracture or dislocation. There is no sign of   osteomyelitis. While there may be some mild fluid in the suprapatellar   region, there is no large bursal distention. The soft tissues anterior to   the knee from the level of the distal quadriceps tendon to the anterior   tibial tubercle are edematous. There is no sign of soft tissue gas or   nonsurgical foreign body.    IMPRESSION:  1. Right knee arthroplasty shows good anatomic alignment with no   disruption or displacement of the hardware.  2. There is no osteomyelitis  3. While there is mild fluid in the suprapatellar bursa, the salient   finding is anterior soft tissue swelling but without intrinsic   calcification, foreign body or soft tissue gas. This extends from the   level of the distal quadriceps to the anterior tibial tubercle.    --- End of Report ---            MARYLOU ALEXANDER MD; Attending Radiologist  This document has been electronically signed. Feb 9 2023  5:42PM    < end of copied text >

## 2023-02-11 LAB
ANION GAP SERPL CALC-SCNC: 7 MMOL/L — SIGNIFICANT CHANGE UP (ref 5–17)
BUN SERPL-MCNC: 8 MG/DL — SIGNIFICANT CHANGE UP (ref 7–18)
CALCIUM SERPL-MCNC: 8.9 MG/DL — SIGNIFICANT CHANGE UP (ref 8.4–10.5)
CHLORIDE SERPL-SCNC: 109 MMOL/L — HIGH (ref 96–108)
CO2 SERPL-SCNC: 26 MMOL/L — SIGNIFICANT CHANGE UP (ref 22–31)
CREAT SERPL-MCNC: 0.7 MG/DL — SIGNIFICANT CHANGE UP (ref 0.5–1.3)
CULTURE RESULTS: SIGNIFICANT CHANGE UP
EGFR: 102 ML/MIN/1.73M2 — SIGNIFICANT CHANGE UP
GLUCOSE SERPL-MCNC: 165 MG/DL — HIGH (ref 70–99)
HCT VFR BLD CALC: 32 % — LOW (ref 39–50)
HGB BLD-MCNC: 10.5 G/DL — LOW (ref 13–17)
MCHC RBC-ENTMCNC: 30.2 PG — SIGNIFICANT CHANGE UP (ref 27–34)
MCHC RBC-ENTMCNC: 32.8 GM/DL — SIGNIFICANT CHANGE UP (ref 32–36)
MCV RBC AUTO: 92 FL — SIGNIFICANT CHANGE UP (ref 80–100)
NRBC # BLD: 0 /100 WBCS — SIGNIFICANT CHANGE UP (ref 0–0)
PLATELET # BLD AUTO: 167 K/UL — SIGNIFICANT CHANGE UP (ref 150–400)
POTASSIUM SERPL-MCNC: 3.3 MMOL/L — LOW (ref 3.5–5.3)
POTASSIUM SERPL-SCNC: 3.3 MMOL/L — LOW (ref 3.5–5.3)
RBC # BLD: 3.48 M/UL — LOW (ref 4.2–5.8)
RBC # FLD: 13.7 % — SIGNIFICANT CHANGE UP (ref 10.3–14.5)
SODIUM SERPL-SCNC: 142 MMOL/L — SIGNIFICANT CHANGE UP (ref 135–145)
SPECIMEN SOURCE: SIGNIFICANT CHANGE UP
WBC # BLD: 7.28 K/UL — SIGNIFICANT CHANGE UP (ref 3.8–10.5)
WBC # FLD AUTO: 7.28 K/UL — SIGNIFICANT CHANGE UP (ref 3.8–10.5)

## 2023-02-11 RX ORDER — POTASSIUM CHLORIDE 20 MEQ
20 PACKET (EA) ORAL ONCE
Refills: 0 | Status: COMPLETED | OUTPATIENT
Start: 2023-02-11 | End: 2023-02-11

## 2023-02-11 RX ADMIN — HEPARIN SODIUM 5000 UNIT(S): 5000 INJECTION INTRAVENOUS; SUBCUTANEOUS at 06:48

## 2023-02-11 RX ADMIN — MEROPENEM 100 MILLIGRAM(S): 1 INJECTION INTRAVENOUS at 06:49

## 2023-02-11 RX ADMIN — AMLODIPINE BESYLATE 2.5 MILLIGRAM(S): 2.5 TABLET ORAL at 06:48

## 2023-02-11 RX ADMIN — FINASTERIDE 5 MILLIGRAM(S): 5 TABLET, FILM COATED ORAL at 13:10

## 2023-02-11 RX ADMIN — MEROPENEM 100 MILLIGRAM(S): 1 INJECTION INTRAVENOUS at 13:10

## 2023-02-11 RX ADMIN — Medication 20 MILLIEQUIVALENT(S): at 16:47

## 2023-02-11 RX ADMIN — HEPARIN SODIUM 5000 UNIT(S): 5000 INJECTION INTRAVENOUS; SUBCUTANEOUS at 13:11

## 2023-02-11 RX ADMIN — MEROPENEM 100 MILLIGRAM(S): 1 INJECTION INTRAVENOUS at 21:28

## 2023-02-11 RX ADMIN — TAMSULOSIN HYDROCHLORIDE 0.4 MILLIGRAM(S): 0.4 CAPSULE ORAL at 21:28

## 2023-02-11 RX ADMIN — Medication 400 MILLIGRAM(S): at 13:10

## 2023-02-11 NOTE — CONSULT NOTE ADULT - SUBJECTIVE AND OBJECTIVE BOX
HPI:  66 yo M w/ pmhx of HTN, afib, BPH s/p TURP on 23 p/w acute development of chills & fever this afternoon.   Pt reports having and uncomplicated TURP on  with lacy removal earlier today at urologist office and claimed he was able to void subsequently. Pt reports development of fatigue, fever, chills, and tremors shortly after that have persisted. Pt denies any current CP, SOB, abdominal pain, N/V/D, or difficulty urinating currently. Pt currently feels fatigued. At intake, Pt was found to be hypotensive to 80s/40s, repeat BP was 95/57 at beside, tachycardic and T of 100.2 after taking tylenol at 1530. Denies any hematuria or dysuria. No chest pain, cough. Of note, patient underwent R TKR in December & has noted residual swelling in knee and leg that has been improving.  In the Ed, patient was given fluids and zosyn. Slight erythema of the right knee with lower extremity swelling and will be getting knee xray and RLE doppler. 20Fr coude lacy catheter placed. States that he has no pain, nausea or vomiting. Only had chills at home. Cultures sent. No dysuria or hematuria. PVR was 590cc prior to lacy placement. (2023 17:20)    History as above, ID consult was called to evaluate this patient from home for sepsis, bacteremia and UTI. Patient has fever spike of 101.2F yesterday, no recurrent fevers to so far. Blood cultures are growing 4/4 bottles with enterobacter cloacae and 1/4 bottle with entercoccus faecalis, sensitivities are pending.      PAST MEDICAL & SURGICAL HISTORY:  Essential hypertension    Type 2 diabetes mellitus without complication, without long-term current use of insulin    Hepatitis C virus infection resolved after antiviral drug therapy    PAF (paroxysmal atrial fibrillation)    SSS (sick sinus syndrome)    Benign prostatic hyperplasia without lower urinary tract symptoms    History of loop recorder    No significant past surgical history      ALLERGIES: No Known Allergies    MEDS:  acetaminophen     Tablet .. 650 milliGRAM(s) Oral every 6 hours PRN  acyclovir   Oral Tab/Cap 400 milliGRAM(s) Oral daily  amLODIPine   Tablet 2.5 milliGRAM(s) Oral daily  finasteride 5 milliGRAM(s) Oral daily  heparin   Injectable 5000 Unit(s) SubCutaneous every 8 hours  meropenem  IVPB 1000 milliGRAM(s) IV Intermittent every 8 hours  potassium chloride    Tablet ER 20 milliEquivalent(s) Oral once  sodium chloride 0.9%. 1000 milliLiter(s) IV Continuous <Continuous>  tamsulosin 0.4 milliGRAM(s) Oral at bedtime    SOCIAL HISTORY:  Smoker:      FAMILY HISTORY: noncontributory      REVIEW OF SYSTEMS:  [  ] Not able to elicit  General:	  Chest:	  GI:	  :  Skin:	  Musculoskeletal:	  Neuro:    VITALS:  Vital Signs Last 24 Hrs  T(C): 36.6 (2023 05:41), Max: 38.4 (10 Feb 2023 20:23)  T(F): 97.9 (2023 05:41), Max: 101.2 (10 Feb 2023 20:23)  HR: 65 (2023 05:41) (61 - 71)  BP: 152/80 (2023 05:41) (116/56 - 152/80)  BP(mean): 104 (2023 05:41) (91 - 104)  RR: 18 (2023 05:41) (18 - 18)  SpO2: 97% (2023 05:41) (97% - 100%)    Parameters below as of 2023 05:41  Patient On (Oxygen Delivery Method): room air        PHYSICAL EXAM:  General:  HEENT:  Neck:  Respiratory:  Cardiovascular:  Gastrointestinal:  :  Extremities:  Skin:  Ortho:  Neuro:      LABS/DIAGNOSTIC TESTS:                        10.5   7.28  )-----------( 167      ( 2023 06:40 )             32.0     WBC Count: 7.28 K/uL ( @ 06:40)  WBC Count: 7.88 K/uL (02-10 @ 06:55)  WBC Count: 10.19 K/uL ( @ 16:00)        142  |  109<H>  |  8   ----------------------------<  165<H>  3.3<L>   |  26  |  0.70    Ca    8.9      2023 06:40  Phos  1.5       Mg     1.9         TPro  7.7  /  Alb  3.2<L>  /  TBili  0.7  /  DBili  x   /  AST  17  /  ALT  18  /  AlkPhos  115      Urinalysis Basic - ( 2023 16:00 )    Color: Yellow / Appearance: Slightly Turbid / S.010 / pH: x  Gluc: x / Ketone: Negative  / Bili: Negative / Urobili: Negative   Blood: x / Protein: 100 / Nitrite: Negative   Leuk Esterase: Moderate / RBC: >50 /HPF / WBC >50 /HPF   Sq Epi: x / Non Sq Epi: Few /HPF / Bacteria: > 10,000 /HPF      LIVER FUNCTIONS - ( 2023 16:00 )  Alb: 3.2 g/dL / Pro: 7.7 g/dL / ALK PHOS: 115 U/L / ALT: 18 U/L DA / AST: 17 U/L / GGT: x           PT/INR - ( 2023 16:00 )   PT: 18.1 sec;   INR: 1.51 ratio    PTT - ( 2023 16:00 )  PTT:28.6 sec      CULTURES:   .Blood Blood-Peripheral   @ 15:42   Growth in aerobic bottle: Gram Negative Rods and Gram positive cocci in  pairs  Growth in anaerobic bottle: Gram Negative Rods  ***Blood Panel PCR results on this specimen are available  approximately 3 hours after the Gram stain result.***  Gram stain, PCR, and/or culture results may not always  correspond due to difference in methodologies.  ************************************************************  This PCR assay was performed by multiplex PCR. This  Assay tests for 66 bacterial and resistance gene targets.  Please refer to the [facility] test directory  at [email or web address] for details.  --  Blood Culture PCR      .Blood Blood-Peripheral   @ 15:37   Growth in aerobic and anaerobic bottles: Gram Negative Rods  --    Growth in aerobic and anaerobic bottles: Gram Negative Rods      Catheterized Catheterized   @ 12:05   >=3 organisms. Probable collection contamination.  --  --      RADIOLOGY: HPI:  68 yo M w/ pmhx of HTN, afib, BPH s/p TURP on 23 p/w acute development of chills & fever this afternoon.   Pt reports having and uncomplicated TURP on  with lacy removal earlier today at urologist office and claimed he was able to void subsequently. Pt reports development of fatigue, fever, chills, and tremors shortly after that have persisted. Pt denies any current CP, SOB, abdominal pain, N/V/D, or difficulty urinating currently. Pt currently feels fatigued. At intake, Pt was found to be hypotensive to 80s/40s, repeat BP was 95/57 at beside, tachycardic and T of 100.2 after taking tylenol at 1530. Denies any hematuria or dysuria. No chest pain, cough. Of note, patient underwent R TKR in December & has noted residual swelling in knee and leg that has been improving.  In the Ed, patient was given fluids and zosyn. Slight erythema of the right knee with lower extremity swelling and will be getting knee xray and RLE doppler. 20Fr coude lacy catheter placed. States that he has no pain, nausea or vomiting. Only had chills at home. Cultures sent. No dysuria or hematuria. PVR was 590cc prior to lacy placement. (2023 17:20)    History as above, ID consult was called to evaluate this patient from home for sepsis, bacteremia and UTI. Patient has fever spike of 101.2F yesterday, no recurrent fevers to so far. Blood cultures are growing 4/4 bottles with enterobacter cloacae and 1/4 bottle with entercoccus faecalis, sensitivities are pending.      PAST MEDICAL & SURGICAL HISTORY:  Essential hypertension    Type 2 diabetes mellitus without complication, without long-term current use of insulin    Hepatitis C virus infection resolved after antiviral drug therapy    PAF (paroxysmal atrial fibrillation)    SSS (sick sinus syndrome)    Benign prostatic hyperplasia without lower urinary tract symptoms    History of loop recorder    No significant past surgical history      ALLERGIES: No Known Allergies    MEDS:  acetaminophen     Tablet .. 650 milliGRAM(s) Oral every 6 hours PRN  acyclovir   Oral Tab/Cap 400 milliGRAM(s) Oral daily  amLODIPine   Tablet 2.5 milliGRAM(s) Oral daily  finasteride 5 milliGRAM(s) Oral daily  heparin   Injectable 5000 Unit(s) SubCutaneous every 8 hours  meropenem  IVPB 1000 milliGRAM(s) IV Intermittent every 8 hours  potassium chloride    Tablet ER 20 milliEquivalent(s) Oral once  sodium chloride 0.9%. 1000 milliLiter(s) IV Continuous <Continuous>  tamsulosin 0.4 milliGRAM(s) Oral at bedtime    SOCIAL HISTORY:  Smoker:   none    FAMILY HISTORY: noncontributory      REVIEW OF SYSTEMS:  [  ] Not able to elicit  General:	  Chest:	  GI:	  :  Skin:	  Musculoskeletal:	  Neuro:    VITALS:  Vital Signs Last 24 Hrs  T(C): 36.6 (2023 05:41), Max: 38.4 (10 Feb 2023 20:23)  T(F): 97.9 (2023 05:41), Max: 101.2 (10 Feb 2023 20:23)  HR: 65 (2023 05:41) (61 - 71)  BP: 152/80 (2023 05:41) (116/56 - 152/80)  BP(mean): 104 (2023 05:41) (91 - 104)  RR: 18 (2023 05:41) (18 - 18)  SpO2: 97% (2023 05:41) (97% - 100%)    Parameters below as of 2023 05:41  Patient On (Oxygen Delivery Method): room air    PHYSICAL EXAM:  General:  HEENT:  Neck:  Respiratory:  Cardiovascular:  Gastrointestinal:  :  Extremities:  Skin:  Ortho:  Neuro:      LABS/DIAGNOSTIC TESTS:                        10.5   7.28  )-----------( 167      ( 2023 06:40 )             32.0     WBC Count: 7.28 K/uL ( @ 06:40)  WBC Count: 7.88 K/uL (02-10 @ 06:55)  WBC Count: 10.19 K/uL ( @ 16:00)        142  |  109<H>  |  8   ----------------------------<  165<H>  3.3<L>   |  26  |  0.70    Ca    8.9      2023 06:40  Phos  1.5       Mg     1.9     02-09    TPro  7.7  /  Alb  3.2<L>  /  TBili  0.7  /  DBili  x   /  AST  17  /  ALT  18  /  AlkPhos  115      Urinalysis Basic - ( 2023 16:00 )  Color: Yellow / Appearance: Slightly Turbid / S.010 / pH: x  Gluc: x / Ketone: Negative  / Bili: Negative / Urobili: Negative   Blood: x / Protein: 100 / Nitrite: Negative   Leuk Esterase: Moderate / RBC: >50 /HPF / WBC >50 /HPF   Sq Epi: x / Non Sq Epi: Few /HPF / Bacteria: > 10,000 /HPF    LIVER FUNCTIONS - ( 2023 16:00 )  Alb: 3.2 g/dL / Pro: 7.7 g/dL / ALK PHOS: 115 U/L / ALT: 18 U/L DA / AST: 17 U/L / GGT: x           PT/INR - ( 2023 16:00 )   PT: 18.1 sec;   INR: 1.51 ratio    PTT - ( 2023 16:00 )  PTT:28.6 sec      CULTURES:   2/10 BC - ordered    .Blood Blood-Peripheral   @ 15:42   Growth in aerobic bottle: Gram Negative Rods and Gram positive cocci in  pairs  Growth in anaerobic bottle: Gram Negative Rods  ***Blood Panel PCR results on this specimen are available  approximately 3 hours after the Gram stain result.***  Gram stain, PCR, and/or culture results may not always  correspond due to difference in methodologies.  ************************************************************  This PCR assay was performed by multiplex PCR. This  Assay tests for 66 bacterial and resistance gene targets.  Please refer to the [facility] Labs test directory  at [email or web address] for details.  --  Blood Culture PCR      .Blood Blood-Peripheral   @ 15:37   Growth in aerobic and anaerobic bottles: Gram Negative Rods  --    Growth in aerobic and anaerobic bottles: Gram Negative Rods      Catheterized Catheterized  01-30 @ 12:05   >=3 organisms. Probable collection contamination.  --  --      RADIOLOGY:  ACC: [id number] EXAM:  XR CHEST PORTABLE URGENT 1V   ORDERED BY:   AGNES WHALEY     PROCEDURE DATE:  2023          INTERPRETATION:  INDICATION: Infection, unspecified    COMPARISON: None    FINDINGS:  An AP portable semiupright view of the chest shows clear lungs   bilaterally. No infiltrates are seen. There is no pneumothorax. There are   no pleural effusions. There is no hilar or mediastinal widening. The   cardiac silhouette may be magnified by technique however there is no CHF.   There is a cardiac loop recorder to the left of midline. The bony thorax   is grossly intact.    IMPRESSION:  1. Clear lungs with no acute cardiopulmonary abnormalities.  2. Cardiac silhouette may be magnified by technique and while there is a   cardiac loop recorder, there is no CHF.        ACC: [id number] EXAM:  XR KNEE AP LAT OBL 3 VIEWS RT   ORDERED BY:   AGNES WHALEY     PROCEDURE DATE:  2023          INTERPRETATION:  3 views of the right knee were obtained for history of   postoperative swelling following knee replacement.    There are no prior studies for comparison.    The patient is undergone right knee arthroplasty, with the implants   showing good anatomic alignment and no sign of hardware loosening or   displacement. There is no fracture or dislocation. There is no sign of   osteomyelitis. While there may be some mild fluid in the suprapatellar   region, there is no large bursal distention. The soft tissues anterior to   the knee from the level of the distal quadriceps tendon to the anterior   tibial tubercle are edematous. There is no sign of soft tissue gas or   nonsurgical foreign body.    IMPRESSION:  1. Right knee arthroplasty shows good anatomic alignment with no   disruption or displacement of the hardware.  2. There is no osteomyelitis  3. While there is mild fluid in the suprapatellar bursa, the salient   finding is anterior soft tissue swelling but without intrinsic   calcification, foreign body or soft tissue gas. This extends from the   level of the distal quadriceps to the anterior tibial tubercle        ACC: [id number] EXAM:  US DPLX LWR EXT VEINS LTD RT   ORDERED BY:   AGNES WHALEY     PROCEDURE DATE:  2023          INTERPRETATION:  CLINICAL INFORMATION: Right leg swelling    COMPARISON: None available.    TECHNIQUE: Duplex sonographyof the RIGHT LOWER extremity veins with   color and spectral Doppler, with and without compression.    FINDINGS:    There is normal compressibility of the right common femoral, femoral and   popliteal veins.  The contralateral common femoral vein is patent.  Doppler examination shows normal spontaneous and phasic flow.    No calf vein thrombosis is detected.    IMPRESSION:  No evidence of right lower extremity deep venous thrombosis.   HPI:  66 yo M w/ pmhx of HTN, afib, BPH s/p TURP on 23 p/w acute development of chills & fever this afternoon.   Pt reports having and uncomplicated TURP on  with lacy removal earlier today at urologist office and claimed he was able to void subsequently. Pt reports development of fatigue, fever, chills, and tremors shortly after that have persisted. Pt denies any current CP, SOB, abdominal pain, N/V/D, or difficulty urinating currently. Pt currently feels fatigued. At intake, Pt was found to be hypotensive to 80s/40s, repeat BP was 95/57 at beside, tachycardic and T of 100.2 after taking tylenol at 1530. Denies any hematuria or dysuria. No chest pain, cough. Of note, patient underwent R TKR in December & has noted residual swelling in knee and leg that has been improving.  In the Ed, patient was given fluids and zosyn. Slight erythema of the right knee with lower extremity swelling and will be getting knee xray and RLE doppler. 20Fr coude lacy catheter placed. States that he has no pain, nausea or vomiting. Only had chills at home. Cultures sent. No dysuria or hematuria. PVR was 590cc prior to lacy placement. (2023 17:20)    History as above, ID consult was called to evaluate this patient from home for sepsis, bacteremia and UTI. Patient has fever spike of 101.2F yesterday, no recurrent fevers to so far. Blood cultures are growing 4/4 bottles with enterobacter cloacae and 1/4 bottle with enterococcus faecalis, sensitivities are pending. At present, patient is doing much better and has no complaints at this time, wife is at bedside. No recurrent fevers today so far and wbc count remains normal.      PAST MEDICAL & SURGICAL HISTORY:  Essential hypertension    Type 2 diabetes mellitus without complication, without long-term current use of insulin    Hepatitis C virus infection resolved after antiviral drug therapy    PAF (paroxysmal atrial fibrillation)    SSS (sick sinus syndrome)    Benign prostatic hyperplasia without lower urinary tract symptoms    History of loop recorder    No significant past surgical history      ALLERGIES: No Known Allergies    MEDS:  acetaminophen     Tablet .. 650 milliGRAM(s) Oral every 6 hours PRN  acyclovir   Oral Tab/Cap 400 milliGRAM(s) Oral daily  amLODIPine   Tablet 2.5 milliGRAM(s) Oral daily  finasteride 5 milliGRAM(s) Oral daily  heparin   Injectable 5000 Unit(s) SubCutaneous every 8 hours  meropenem  IVPB 1000 milliGRAM(s) IV Intermittent every 8 hours  potassium chloride    Tablet ER 20 milliEquivalent(s) Oral once  sodium chloride 0.9%. 1000 milliLiter(s) IV Continuous <Continuous>  tamsulosin 0.4 milliGRAM(s) Oral at bedtime    SOCIAL HISTORY:  Smoker:  none    FAMILY HISTORY: noncontributory    REVIEW OF SYSTEMS:  [  ] Not able to elicit  General: no fevers no malaise   Chest: no cough no sob no CP  GI: no nvd no abdominal pain  : no urinary symptoms   Skin: no rashes no cyanosis  Musculoskeletal: no trauma no LBP  Neuro: no ha's no dizziness     VITALS:  Vital Signs Last 24 Hrs  T(C): 36.6 (2023 05:41), Max: 38.4 (10 Feb 2023 20:23)  T(F): 97.9 (2023 05:41), Max: 101.2 (10 Feb 2023 20:23)  HR: 65 (2023 05:41) (61 - 71)  BP: 152/80 (2023 05:41) (116/56 - 152/80)  BP(mean): 104 (2023 05:41) (91 - 104)  RR: 18 (2023 05:41) (18 - 18)  SpO2: 97% (2023 05:41) (97% - 100%)    PHYSICAL EXAM:  General: well developed older male in NAD  HEENT: normocephalic with moist oral mucosa  Neck: supple no LN's no JVD  Respiratory: lungs clear no rales no rhonchi  Cardiovascular: S1 S2 reg no murmurs  Gastrointestinal: +BS with soft, nondistended abdomen; nontender  : +lacy with slightly cloudy urine  Extremities: RLE mild edema no cyanosis  Skin: no rashes  Ortho: no jt swelling  Neuro: AAO x 3    LABS/DIAGNOSTIC TESTS:                        10.5   7.28  )-----------( 167      ( 2023 06:40 )             32.0     WBC Count: 7.28 K/uL (02-11 @ 06:40)  WBC Count: 7.88 K/uL (02-10 @ 06:55)  WBC Count: 10.19 K/uL ( @ 16:00)        142  |  109<H>  |  8   ----------------------------<  165<H>  3.3<L>   |  26  |  0.70    Ca    8.9      2023 06:40  Phos  1.5       Mg     1.9         TPro  7.7  /  Alb  3.2<L>  /  TBili  0.7  /  DBili  x   /  AST  17  /  ALT  18  /  AlkPhos  115      Urinalysis Basic - ( 2023 16:00 )  Color: Yellow / Appearance: Slightly Turbid / S.010 / pH: x  Gluc: x / Ketone: Negative  / Bili: Negative / Urobili: Negative   Blood: x / Protein: 100 / Nitrite: Negative   Leuk Esterase: Moderate / RBC: >50 /HPF / WBC >50 /HPF   Sq Epi: x / Non Sq Epi: Few /HPF / Bacteria: > 10,000 /HPF    LIVER FUNCTIONS - ( 2023 16:00 )  Alb: 3.2 g/dL / Pro: 7.7 g/dL / ALK PHOS: 115 U/L / ALT: 18 U/L DA / AST: 17 U/L / GGT: x           PT/INR - ( 2023 16:00 )   PT: 18.1 sec;   INR: 1.51 ratio    PTT - ( 2023 16:00 )  PTT:28.6 sec      CULTURES:    UC - pending     .Blood Blood-Peripheral   @ 15:42   Growth in aerobic bottle: Gram Negative Rods and Gram positive cocci in  pairs  Growth in anaerobic bottle: Gram Negative Rods  ***Blood Panel PCR results on this specimen are available  approximately 3 hours after the Gram stain result.***  Gram stain, PCR, and/or culture results may not always  correspond due to difference in methodologies.  ************************************************************  This PCR assay was performed by multiplex PCR. This  Assay tests for 66 bacterial and resistance gene targets.  Please refer to the [facility] test directory  at [email or web address] for details.  --  Blood Culture PCR      .Blood Blood-Peripheral   @ 15:37   Growth in aerobic and anaerobic bottles: Gram Negative Rods  --    Growth in aerobic and anaerobic bottles: Gram Negative Rods      Catheterized Catheterized   @ 12:05   >=3 organisms. Probable collection contamination.  --  --      RADIOLOGY:  ACC: [id number] EXAM:  XR CHEST PORTABLE URGENT 1V   ORDERED BY:   AGNES WHALEY     PROCEDURE DATE:  2023          INTERPRETATION:  INDICATION: Infection, unspecified    COMPARISON: None    FINDINGS:  An AP portable semiupright view of the chest shows clear lungs   bilaterally. No infiltrates are seen. There is no pneumothorax. There are   no pleural effusions. There is no hilar or mediastinal widening. The   cardiac silhouette may be magnified by technique however there is no CHF.   There is a cardiac loop recorder to the left of midline. The bony thorax   is grossly intact.    IMPRESSION:  1. Clear lungs with no acute cardiopulmonary abnormalities.  2. Cardiac silhouette may be magnified by technique and while there is a   cardiac loop recorder, there is no CHF.        ACC: [id number] EXAM:  XR KNEE AP LAT OBL 3 VIEWS RT   ORDERED BY:   AGNES WHALEY     PROCEDURE DATE:  2023          INTERPRETATION:  3 views of the right knee were obtained for history of   postoperative swelling following knee replacement.    There are no prior studies for comparison.    The patient is undergone right knee arthroplasty, with the implants   showing good anatomic alignment and no sign of hardware loosening or   displacement. There is no fracture or dislocation. There is no sign of   osteomyelitis. While there may be some mild fluid in the suprapatellar   region, there is no large bursal distention. The soft tissues anterior to   the knee from the level of the distal quadriceps tendon to the anterior   tibial tubercle are edematous. There is no sign of soft tissue gas or   nonsurgical foreign body.    IMPRESSION:  1. Right knee arthroplasty shows good anatomic alignment with no   disruption or displacement of the hardware.  2. There is no osteomyelitis  3. While there is mild fluid in the suprapatellar bursa, the salient   finding is anterior soft tissue swelling but without intrinsic   calcification, foreign body or soft tissue gas. This extends from the   level of the distal quadriceps to the anterior tibial tubercle        ACC: [id number] EXAM:  US DPLX LWR EXT VEINS LTD RT   ORDERED BY:   AGNES WHALEY     PROCEDURE DATE:  2023          INTERPRETATION:  CLINICAL INFORMATION: Right leg swelling    COMPARISON: None available.    TECHNIQUE: Duplex sonographyof the RIGHT LOWER extremity veins with   color and spectral Doppler, with and without compression.    FINDINGS:    There is normal compressibility of the right common femoral, femoral and   popliteal veins.  The contralateral common femoral vein is patent.  Doppler examination shows normal spontaneous and phasic flow.    No calf vein thrombosis is detected.    IMPRESSION:  No evidence of right lower extremity deep venous thrombosis.

## 2023-02-11 NOTE — PROGRESS NOTE ADULT - SUBJECTIVE AND OBJECTIVE BOX
INTERVAL HPI/OVERNIGHT EVENTS:    Pt seen and examined at bedside. Offers no acute complaints at this time.   Tmax 101.2     Vital Signs Last 24 Hrs  T(C): 36.6 (11 Feb 2023 05:41), Max: 38.4 (10 Feb 2023 20:23)  T(F): 97.9 (11 Feb 2023 05:41), Max: 101.2 (10 Feb 2023 20:23)  HR: 65 (11 Feb 2023 05:41) (61 - 71)  BP: 152/80 (11 Feb 2023 05:41) (116/56 - 152/80)  BP(mean): 104 (11 Feb 2023 05:41) (91 - 104)  RR: 18 (11 Feb 2023 05:41) (18 - 18)  SpO2: 97% (11 Feb 2023 05:41) (97% - 100%)    Parameters below as of 11 Feb 2023 05:41  Patient On (Oxygen Delivery Method): room air      I&O's Detail    10 Feb 2023 07:01  -  11 Feb 2023 07:00  --------------------------------------------------------  IN:  Total IN: 0 mL    OUT:    Indwelling Catheter - Urethral (mL): 450 mL    Voided (mL): 1000 mL  Total OUT: 1450 mL    Total NET: -1450 mL        acyclovir   Oral Tab/Cap 400 milliGRAM(s) Oral daily  meropenem  IVPB 1000 milliGRAM(s) IV Intermittent every 8 hours  tamsulosin 0.4 milliGRAM(s) Oral at bedtime      Physical Exam  General: AAOx3, No acute distress  Abdomen: soft, nondistended, nontender, no rebound tenderness, no guarding, no palpable masses  : Normal external genitalia, lacy catheter in place, UO yellow, cloudy   Extremities: non edematous, no calf pain bilaterally      Labs:                        10.5   7.28  )-----------( 167      ( 11 Feb 2023 06:40 )             32.0     02-11    142  |  109<H>  |  8   ----------------------------<  165<H>  3.3<L>   |  26  |  0.70    Ca    8.9      11 Feb 2023 06:40  Phos  1.5     02-09  Mg     1.9     02-09    TPro  7.7  /  Alb  3.2<L>  /  TBili  0.7  /  DBili  x   /  AST  17  /  ALT  18  /  AlkPhos  115  02-09    PT/INR - ( 09 Feb 2023 16:00 )   PT: 18.1 sec;   INR: 1.51 ratio         PTT - ( 09 Feb 2023 16:00 )  PTT:28.6 sec

## 2023-02-11 NOTE — CONSULT NOTE ADULT - ASSESSMENT
Sepsis  Bacteremia (urine is likely source)  Fevers - none today so far    Plan:  ·	cont meropenem 1gm IV q8h (D3 of all abx)  ·	repeat BC in am  ·	awaiting UC results

## 2023-02-11 NOTE — PROGRESS NOTE ADULT - ASSESSMENT
66 yo M w/ pmhx of HTN, afib, BPH s/p TURP on 2/6/23; Bacteremia, hypokalemia   Tmax 101.2    -Replete K levels   -Reg diet   -F/u rpt bcx   -Abx   -ID f/u   -Teran   -OOB/Ambulate   -DVT ppx

## 2023-02-11 NOTE — PHARMACOTHERAPY INTERVENTION NOTE - COMMENTS
Patient BCX 4/4 enterobacter and e. faecalis, currently sensitivities unavailable, unclear if MDRO enterobacter at this time. Given recent history of TURP, reasonable to continue meropenem pending final C&S. Dosage appropriate for current renal function.

## 2023-02-12 LAB
-  AMIKACIN: SIGNIFICANT CHANGE UP
-  AMPICILLIN/SULBACTAM: SIGNIFICANT CHANGE UP
-  AMPICILLIN: SIGNIFICANT CHANGE UP
-  AZTREONAM: SIGNIFICANT CHANGE UP
-  CEFAZOLIN: SIGNIFICANT CHANGE UP
-  CEFEPIME: SIGNIFICANT CHANGE UP
-  CEFOXITIN: SIGNIFICANT CHANGE UP
-  CEFTRIAXONE: SIGNIFICANT CHANGE UP
-  CIPROFLOXACIN: SIGNIFICANT CHANGE UP
-  ERTAPENEM: SIGNIFICANT CHANGE UP
-  GENTAMICIN: SIGNIFICANT CHANGE UP
-  IMIPENEM: SIGNIFICANT CHANGE UP
-  LEVOFLOXACIN: SIGNIFICANT CHANGE UP
-  MEROPENEM: SIGNIFICANT CHANGE UP
-  PIPERACILLIN/TAZOBACTAM: SIGNIFICANT CHANGE UP
-  TOBRAMYCIN: SIGNIFICANT CHANGE UP
-  TRIMETHOPRIM/SULFAMETHOXAZOLE: SIGNIFICANT CHANGE UP
ANION GAP SERPL CALC-SCNC: 7 MMOL/L — SIGNIFICANT CHANGE UP (ref 5–17)
BASOPHILS # BLD AUTO: 0.04 K/UL — SIGNIFICANT CHANGE UP (ref 0–0.2)
BASOPHILS NFR BLD AUTO: 0.8 % — SIGNIFICANT CHANGE UP (ref 0–2)
BUN SERPL-MCNC: 13 MG/DL — SIGNIFICANT CHANGE UP (ref 7–18)
CALCIUM SERPL-MCNC: 9.3 MG/DL — SIGNIFICANT CHANGE UP (ref 8.4–10.5)
CHLORIDE SERPL-SCNC: 107 MMOL/L — SIGNIFICANT CHANGE UP (ref 96–108)
CO2 SERPL-SCNC: 28 MMOL/L — SIGNIFICANT CHANGE UP (ref 22–31)
CREAT SERPL-MCNC: 0.61 MG/DL — SIGNIFICANT CHANGE UP (ref 0.5–1.3)
CULTURE RESULTS: SIGNIFICANT CHANGE UP
EGFR: 107 ML/MIN/1.73M2 — SIGNIFICANT CHANGE UP
EOSINOPHIL # BLD AUTO: 0.14 K/UL — SIGNIFICANT CHANGE UP (ref 0–0.5)
EOSINOPHIL NFR BLD AUTO: 2.9 % — SIGNIFICANT CHANGE UP (ref 0–6)
GLUCOSE SERPL-MCNC: 143 MG/DL — HIGH (ref 70–99)
HCT VFR BLD CALC: 31.3 % — LOW (ref 39–50)
HGB BLD-MCNC: 10.3 G/DL — LOW (ref 13–17)
IMM GRANULOCYTES NFR BLD AUTO: 0.4 % — SIGNIFICANT CHANGE UP (ref 0–0.9)
LYMPHOCYTES # BLD AUTO: 1.42 K/UL — SIGNIFICANT CHANGE UP (ref 1–3.3)
LYMPHOCYTES # BLD AUTO: 29.4 % — SIGNIFICANT CHANGE UP (ref 13–44)
MCHC RBC-ENTMCNC: 30 PG — SIGNIFICANT CHANGE UP (ref 27–34)
MCHC RBC-ENTMCNC: 32.9 GM/DL — SIGNIFICANT CHANGE UP (ref 32–36)
MCV RBC AUTO: 91.3 FL — SIGNIFICANT CHANGE UP (ref 80–100)
METHOD TYPE: SIGNIFICANT CHANGE UP
MONOCYTES # BLD AUTO: 0.47 K/UL — SIGNIFICANT CHANGE UP (ref 0–0.9)
MONOCYTES NFR BLD AUTO: 9.7 % — SIGNIFICANT CHANGE UP (ref 2–14)
NEUTROPHILS # BLD AUTO: 2.74 K/UL — SIGNIFICANT CHANGE UP (ref 1.8–7.4)
NEUTROPHILS NFR BLD AUTO: 56.8 % — SIGNIFICANT CHANGE UP (ref 43–77)
NRBC # BLD: 0 /100 WBCS — SIGNIFICANT CHANGE UP (ref 0–0)
PLATELET # BLD AUTO: 190 K/UL — SIGNIFICANT CHANGE UP (ref 150–400)
POTASSIUM SERPL-MCNC: 3.7 MMOL/L — SIGNIFICANT CHANGE UP (ref 3.5–5.3)
POTASSIUM SERPL-SCNC: 3.7 MMOL/L — SIGNIFICANT CHANGE UP (ref 3.5–5.3)
RBC # BLD: 3.43 M/UL — LOW (ref 4.2–5.8)
RBC # FLD: 13.7 % — SIGNIFICANT CHANGE UP (ref 10.3–14.5)
SODIUM SERPL-SCNC: 142 MMOL/L — SIGNIFICANT CHANGE UP (ref 135–145)
SPECIMEN SOURCE: SIGNIFICANT CHANGE UP
WBC # BLD: 4.83 K/UL — SIGNIFICANT CHANGE UP (ref 3.8–10.5)
WBC # FLD AUTO: 4.83 K/UL — SIGNIFICANT CHANGE UP (ref 3.8–10.5)

## 2023-02-12 RX ADMIN — MEROPENEM 100 MILLIGRAM(S): 1 INJECTION INTRAVENOUS at 06:20

## 2023-02-12 RX ADMIN — TAMSULOSIN HYDROCHLORIDE 0.4 MILLIGRAM(S): 0.4 CAPSULE ORAL at 21:14

## 2023-02-12 RX ADMIN — FINASTERIDE 5 MILLIGRAM(S): 5 TABLET, FILM COATED ORAL at 12:59

## 2023-02-12 RX ADMIN — MEROPENEM 100 MILLIGRAM(S): 1 INJECTION INTRAVENOUS at 21:15

## 2023-02-12 RX ADMIN — AMLODIPINE BESYLATE 2.5 MILLIGRAM(S): 2.5 TABLET ORAL at 06:20

## 2023-02-12 RX ADMIN — Medication 400 MILLIGRAM(S): at 12:59

## 2023-02-12 RX ADMIN — MEROPENEM 100 MILLIGRAM(S): 1 INJECTION INTRAVENOUS at 14:48

## 2023-02-12 RX ADMIN — HEPARIN SODIUM 5000 UNIT(S): 5000 INJECTION INTRAVENOUS; SUBCUTANEOUS at 14:46

## 2023-02-12 NOTE — PROGRESS NOTE ADULT - NS ATTEND AMEND GEN_ALL_CORE FT
I agree with above  At the time of my evaluation he had argenis hematuria in his lacy , he might have pulled on it as he has been walking around.  will DC his SQ heparin.

## 2023-02-12 NOTE — PROGRESS NOTE ADULT - SUBJECTIVE AND OBJECTIVE BOX
67y Male is under our care for bacteremia, prior sepsis and fevers. Patient had recent TURP on 2/06. He continues to do well today and has no complaints at this time. No fevers for past 36 hours.  UC is growing >3 organisms, contaminant. Still waiting on sensitivity for enterococcus. Repeat BCs were done this morning and are testing.     MEDS:  meropenem  IVPB 1000 milliGRAM(s) IV Intermittent every 8 hours    ALLERGIES: Allergies    No Known Allergies    Intolerances    REVIEW OF SYSTEMS:  [  ] Not able to elicit  General: no fevers no malaise  Chest: no cough no sob  GI: no nvd  : no urinary sxs   Skin: no rashes  Musculoskeletal: no trauma no LBP  Neuro: no ha's no dizziness 	    VITALS:  Vital Signs Last 24 Hrs  T(C): 36.2 (12 Feb 2023 13:05), Max: 36.6 (11 Feb 2023 14:24)  T(F): 97.2 (12 Feb 2023 13:05), Max: 97.9 (12 Feb 2023 05:42)  HR: 63 (12 Feb 2023 13:05) (59 - 64)  BP: 142/78 (12 Feb 2023 13:05) (129/64 - 142/78)  BP(mean): 81 (12 Feb 2023 05:42) (81 - 81)  RR: 18 (12 Feb 2023 13:05) (18 - 18)  SpO2: 99% (12 Feb 2023 13:05) (98% - 99%)    PHYSICAL EXAM:  HEENT: n/a  Neck: supple no LN's   Respiratory: lungs clear no rales no rhonchi  Cardiovascular: S1 S2 reg no murmurs  Gastrointestinal: +BS with soft, nondistended abdomen; nontender  : +lacy, urine in tubing appears more clear today  Extremities: RLE mild edema   Skin: no rashes  Ortho: no jt swelling  Neuro: awake and alert    LABS/DIAGNOSTIC TESTS:                        10.3   4.83  )-----------( 190      ( 12 Feb 2023 06:40 )             31.3     WBC Count: 4.83 K/uL (02-12 @ 06:40)  WBC Count: 7.28 K/uL (02-11 @ 06:40)  WBC Count: 7.88 K/uL (02-10 @ 06:55)  WBC Count: 10.19 K/uL (02-09 @ 16:00)    02-12    142  |  107  |  13  ----------------------------<  143<H>  3.7   |  28  |  0.61    Ca    9.3      12 Feb 2023 06:40        CULTURES:   2/12 BC - pending     Clean Catch Clean Catch (Midstream)  02-09 @ 16:00   >=3 organisms. Probable collection contamination.  --  --      .Blood Blood-Peripheral  02-09 @ 15:42   Growth in aerobic and anaerobic bottles: Enterobacter cloacae complex  Growth in aerobic and anaerobic bottles: Enterococcus faecalis  ***Blood Panel PCR results on this specimen are available  approximately 3 hours after the Gram stain result.***  Gram stain, PCR, and/or culture results may not always  correspond due to difference in methodologies.  ************************************************************  This PCR assay was performed by multiplex PCR. This  Assay tests for 66 bacterial and resistance gene targets.  Please refer to the [facility] test directory  at [email or web address] for details.  --  Blood Culture PCR  Enterobacter cloacae complex      .Blood Blood-Peripheral  02-09 @ 15:37   Growth in aerobic and anaerobic bottles: Enterobacter cloacae complex  See previous culture  [id number]  --    Growth in aerobic and anaerobic bottles: Gram Negative Rods      RADIOLOGY:  no new studies

## 2023-02-12 NOTE — PROGRESS NOTE ADULT - SUBJECTIVE AND OBJECTIVE BOX
INTERVAL HPI/OVERNIGHT EVENTS:    Pt seen and examined at bedside. Offers no acute complaints at this time.     Vital Signs Last 24 Hrs  T(C): 36.6 (12 Feb 2023 05:42), Max: 36.6 (11 Feb 2023 14:24)  T(F): 97.9 (12 Feb 2023 05:42), Max: 97.9 (12 Feb 2023 05:42)  HR: 59 (12 Feb 2023 05:42) (59 - 64)  BP: 129/64 (12 Feb 2023 05:42) (129/64 - 142/73)  BP(mean): 81 (12 Feb 2023 05:42) (81 - 81)  RR: 18 (12 Feb 2023 05:42) (18 - 18)  SpO2: 99% (12 Feb 2023 05:42) (98% - 99%)    Parameters below as of 12 Feb 2023 05:42  Patient On (Oxygen Delivery Method): room air      I&O's Detail    11 Feb 2023 07:01  -  12 Feb 2023 07:00  --------------------------------------------------------  IN:  Total IN: 0 mL    OUT:    Indwelling Catheter - Urethral (mL): 2500 mL  Total OUT: 2500 mL    Total NET: -2500 mL        acyclovir   Oral Tab/Cap 400 milliGRAM(s) Oral daily  meropenem  IVPB 1000 milliGRAM(s) IV Intermittent every 8 hours  tamsulosin 0.4 milliGRAM(s) Oral at bedtime        Physical Exam  General: AAOx3, No acute distress  Abdomen: soft, nondistended, nontender, no rebound tenderness, no guarding, no palpable masses  : Normal external genitalia, lacy catheter in place, UO yellow, cloudy   Extremities: non edematous, no calf pain bilaterally      Labs:                        10.3   4.83  )-----------( 190      ( 12 Feb 2023 06:40 )             31.3     02-12    142  |  107  |  13  ----------------------------<  143<H>  3.7   |  28  |  0.61    Ca    9.3      12 Feb 2023 06:40

## 2023-02-12 NOTE — PROGRESS NOTE ADULT - ASSESSMENT
Bacteremia (urine is likely source)  S/p sepsis and fevers      Plan:  ·	cont meropenem 1gm IV q8h (D4 of all abx)  ·	awaiting sensitivities for all organisms from initial BC   ·	awaiting repeat BC results

## 2023-02-12 NOTE — PROGRESS NOTE ADULT - ATTENDING COMMENTS
68 yo M w/ pmhx of HTN, afib, BPH s/p TURP on 2/6/23; Bacteremia, hypokalemia   Tmax 101.2    -Replete K levels   -Reg diet   -F/u rpt bcx   -Abx   -ID f/u   -Teran   -OOB/Ambulate   -DVT ppx
66 yo M w/ pmhx of HTN, afib, BPH s/p TURP on 2/6/23 p/w acute development of chills & fever this afternoon. Temp to 100.2, Tachy, and hypotensive in ED. WBC wnl, Cr wnl. Dirty U/A, cultures pending.  - AM labs reviewed  - IV abx - zosyn  - IVF  - Regular diet  - f/u cultures  - continue lacy catheter  - Knee imaging and RLE U/S reviewed - negative  - OOB/IS/DVT ppx
68 yo M w/ pmhx of HTN, afib, BPH s/p TURP on 2/6/23; Bacteremia    -Reg diet   -F/u rpt bcx   -Abx   -ID f/u   -Teran   -OOB/Ambulate   -DVT ppx

## 2023-02-12 NOTE — PROGRESS NOTE ADULT - ASSESSMENT
66 yo M w/ pmhx of HTN, afib, BPH s/p TURP on 2/6/23; Bacteremia    -Reg diet   -F/u rpt bcx   -Abx   -ID f/u   -Teran   -OOB/Ambulate   -DVT ppx

## 2023-02-13 LAB
-  AMPICILLIN: SIGNIFICANT CHANGE UP
-  GENTAMICIN SYNERGY: SIGNIFICANT CHANGE UP
-  STREPTOMYCIN SYNERGY: SIGNIFICANT CHANGE UP
-  VANCOMYCIN: SIGNIFICANT CHANGE UP
CULTURE RESULTS: SIGNIFICANT CHANGE UP
METHOD TYPE: SIGNIFICANT CHANGE UP
ORGANISM # SPEC MICROSCOPIC CNT: SIGNIFICANT CHANGE UP
SPECIMEN SOURCE: SIGNIFICANT CHANGE UP

## 2023-02-13 RX ORDER — HEPARIN SODIUM 5000 [USP'U]/ML
5000 INJECTION INTRAVENOUS; SUBCUTANEOUS EVERY 8 HOURS
Refills: 0 | Status: DISCONTINUED | OUTPATIENT
Start: 2023-02-13 | End: 2023-02-14

## 2023-02-13 RX ADMIN — Medication 400 MILLIGRAM(S): at 12:50

## 2023-02-13 RX ADMIN — MEROPENEM 100 MILLIGRAM(S): 1 INJECTION INTRAVENOUS at 21:28

## 2023-02-13 RX ADMIN — TAMSULOSIN HYDROCHLORIDE 0.4 MILLIGRAM(S): 0.4 CAPSULE ORAL at 21:27

## 2023-02-13 RX ADMIN — AMLODIPINE BESYLATE 2.5 MILLIGRAM(S): 2.5 TABLET ORAL at 05:21

## 2023-02-13 RX ADMIN — MEROPENEM 100 MILLIGRAM(S): 1 INJECTION INTRAVENOUS at 14:18

## 2023-02-13 RX ADMIN — MEROPENEM 100 MILLIGRAM(S): 1 INJECTION INTRAVENOUS at 05:21

## 2023-02-13 RX ADMIN — FINASTERIDE 5 MILLIGRAM(S): 5 TABLET, FILM COATED ORAL at 12:50

## 2023-02-13 NOTE — PROGRESS NOTE ADULT - SUBJECTIVE AND OBJECTIVE BOX
67y Male    Meds:  acyclovir   Oral Tab/Cap 400 milliGRAM(s) Oral daily  meropenem  IVPB 1000 milliGRAM(s) IV Intermittent every 8 hours    Allergies    No Known Allergies    Intolerances        VITALS:  Vital Signs Last 24 Hrs  T(C): 36.3 (13 Feb 2023 12:55), Max: 36.9 (12 Feb 2023 20:55)  T(F): 97.3 (13 Feb 2023 12:55), Max: 98.5 (12 Feb 2023 20:55)  HR: 64 (13 Feb 2023 12:55) (59 - 67)  BP: 142/77 (13 Feb 2023 12:55) (139/64 - 148/70)  BP(mean): --  RR: 18 (13 Feb 2023 12:55) (17 - 18)  SpO2: 96% (13 Feb 2023 12:55) (96% - 100%)    Parameters below as of 13 Feb 2023 12:55  Patient On (Oxygen Delivery Method): room air        LABS/DIAGNOSTIC TESTS:                          10.3   4.83  )-----------( 190      ( 12 Feb 2023 06:40 )             31.3         02-12    142  |  107  |  13  ----------------------------<  143<H>  3.7   |  28  |  0.61    Ca    9.3      12 Feb 2023 06:40            CULTURES: .Blood Blood-Peripheral  02-12 @ 06:45   No growth to date.  --  --      .Blood Blood-Peripheral  02-12 @ 06:40   No growth to date.  --  --      Clean Catch Clean Catch (Midstream)  02-09 @ 16:00   >=3 organisms. Probable collection contamination.  --  --      .Blood Blood-Peripheral  02-09 @ 15:42   Growth in aerobic and anaerobic bottles: Enterobacter cloacae complex  Growth in aerobic and anaerobic bottles: Enterococcus faecalis  ***Blood Panel PCR results on this specimen are available  approximately 3 hours after the Gram stain result.***  Gram stain, PCR, and/or culture results may not always  correspond due to difference in methodologies.  ************************************************************  This PCR assay was performed by multiplex PCR. This  Assay tests for 66 bacterial and resistance gene targets.  Please refer to the [facility] test directory  at [email or web address] for details.  --  Blood Culture PCR  Enterobacter cloacae complex  Enterococcus faecalis      .Blood Blood-Peripheral  02-09 @ 15:37   Growth in aerobic and anaerobic bottles: Enterobacter cloacae complex  See previous culture  [id number]  --    Growth in aerobic and anaerobic bottles: Gram Negative Rods      Catheterized Catheterized  01-30 @ 12:05   >=3 organisms. Probable collection contamination.  --  --            RADIOLOGY:      ROS:  [  ] UNABLE TO ELICIT 67y Male who is doing well, he has no hematuria now and urine in lacy is clear. He has no fevers or chills , no diarrhea, abdominal pain, nausea or vomiting. Hi repeat blood cultures are negative to date. I spoke with his nephew who is a cardiologist and the patient and both are agreeable to him getting 4 weeks of IV abxs given he has Enterococcus in the blood along with Enterobacter.     Meds:  acyclovir   Oral Tab/Cap 400 milliGRAM(s) Oral daily  meropenem  IVPB 1000 milliGRAM(s) IV Intermittent every 8 hours    Allergies    No Known Allergies    Intolerances        VITALS:  Vital Signs Last 24 Hrs  T(C): 36.3 (13 Feb 2023 12:55), Max: 36.9 (12 Feb 2023 20:55)  T(F): 97.3 (13 Feb 2023 12:55), Max: 98.5 (12 Feb 2023 20:55)  HR: 64 (13 Feb 2023 12:55) (59 - 67)  BP: 142/77 (13 Feb 2023 12:55) (139/64 - 148/70)  BP(mean): --  RR: 18 (13 Feb 2023 12:55) (17 - 18)  SpO2: 96% (13 Feb 2023 12:55) (96% - 100%)    Parameters below as of 13 Feb 2023 12:55  Patient On (Oxygen Delivery Method): room air        LABS/DIAGNOSTIC TESTS:                          10.3   4.83  )-----------( 190      ( 12 Feb 2023 06:40 )             31.3         02-12    142  |  107  |  13  ----------------------------<  143<H>  3.7   |  28  |  0.61    Ca    9.3      12 Feb 2023 06:40            CULTURES: .Blood Blood-Peripheral  02-12 @ 06:45   No growth to date.  --  --      .Blood Blood-Peripheral  02-12 @ 06:40   No growth to date.  --  --      Clean Catch Clean Catch (Midstream)  02-09 @ 16:00   >=3 organisms. Probable collection contamination.  --  --      .Blood Blood-Peripheral  02-09 @ 15:42   Growth in aerobic and anaerobic bottles: Enterobacter cloacae complex  Growth in aerobic and anaerobic bottles: Enterococcus faecalis  ***Blood Panel PCR results on this specimen are available  approximately 3 hours after the Gram stain result.***  Gram stain, PCR, and/or culture results may not always  correspond due to difference in methodologies.  ************************************************************  This PCR assay was performed by multiplex PCR. This  Assay tests for 66 bacterial and resistance gene targets.  Please refer to the [facility] test directory  at [email or web address] for details.  --  Blood Culture PCR  Enterobacter cloacae complex  Enterococcus faecalis      .Blood Blood-Peripheral  02-09 @ 15:37   Growth in aerobic and anaerobic bottles: Enterobacter cloacae complex  See previous culture  [id number]  --    Growth in aerobic and anaerobic bottles: Gram Negative Rods      Catheterized Catheterized  01-30 @ 12:05   >=3 organisms. Probable collection contamination.  --  --            RADIOLOGY:      ROS:  [  ] UNABLE TO ELICIT

## 2023-02-13 NOTE — PROGRESS NOTE ADULT - SUBJECTIVE AND OBJECTIVE BOX
Subjective  Pt seen and examined at bedside. Offers no acute complaints at this time.     Objective    Vital signs  T(F): , Max: 98.5 (02-12-23 @ 20:55)  HR: 59 (02-13-23 @ 05:20)  BP: 148/70 (02-13-23 @ 05:20)  SpO2: 97% (02-13-23 @ 05:20)  Wt(kg): --    Output     OUT:    Indwelling Catheter - Urethral (mL): 4150 mL  Total OUT: 4150 mL    Total NET: -4150 mL    Physical Exam  General: AAOx3, No acute distress  Abdomen: soft, nondistended, nontender, no rebound tenderness, no guarding, no palpable masses  : Normal external genitalia, lacy catheter in place, UO yellow, cloudy   Extremities: non edematous, no calf pain bilaterally    Labs      02-12 @ 06:40    WBC 4.83  / Hct 31.3  / SCr 0.61         Culture - Urine (collected 02-09-23 @ 16:00)  Source: Clean Catch Clean Catch (Midstream)  Final Report (02-11-23 @ 12:28):    >=3 organisms. Probable collection contamination.    Culture - Blood (collected 02-09-23 @ 15:42)  Source: .Blood Blood-Peripheral  Gram Stain (02-10-23 @ 11:53):    Growth in aerobic bottle: Gram Negative Rods and Gram positive cocci in    pairs    Growth in anaerobic bottle: Gram Negative Rods  Preliminary Report (02-12-23 @ 11:48):    Growth in aerobic and anaerobic bottles: Enterobacter cloacae complex    Growth in aerobic and anaerobic bottles: Enterococcus faecalis    ***Blood Panel PCR results on this specimen are available    approximately 3 hours after the Gram stain result.***    Gram stain, PCR, and/or culture results may not always    correspond due to difference in methodologies.    ************************************************************    This PCR assay was performed by multiplex PCR. This    Assay tests for 66 bacterial and resistance gene targets.    Please refer to the [facility] test directory    at [email or web address] for details.  Organism: Blood Culture PCR  Enterobacter cloacae complex (02-12-23 @ 09:43)  Organism: Enterobacter cloacae complex (02-12-23 @ 09:43)      -  Amikacin: S <=16      -  Ampicillin: R >16 These ampicillin results predict results for amoxicillin      -  Ampicillin/Sulbactam: R >16/8 Enterobacter, Klebsiella aerogenes, Citrobacter, and Serratia may develop resistance during prolonged therapy (3-4 days)      -  Aztreonam: R >16      -  Cefazolin: R >16 Enterobacter, Klebsiella aerogenes, Citrobacter, and Serratia may develop resistance during prolonged therapy (3-4 days)      -  Cefepime: S 4      -  Cefoxitin: R >16      -  Ceftriaxone: R >32 Enterobacter, Klebsiella aerogenes, Citrobacter, and Serratia may develop resistance during prolonged therapy      -  Ciprofloxacin: S <=0.25      -  Ertapenem: S <=0.5      -  Gentamicin: S <=2      -  Imipenem: S <=1      -  Levofloxacin: S <=0.5      -  Meropenem: S <=1      -  Piperacillin/Tazobactam: R >64      -  Tobramycin: S <=2      -  Trimethoprim/Sulfamethoxazole: S <=0.5/9.5      Method Type: HARVEY  Organism: Blood Culture PCR (02-10-23 @ 12:08)      -  Enterobacter cloacae complex: Detec      -  Enterococcus faecalis: Detec      Method Type: PCR    Culture - Blood (collected 02-09-23 @ 15:37)  Source: .Blood Blood-Peripheral  Gram Stain (02-10-23 @ 11:54):    Growth in aerobic and anaerobic bottles: Gram Negative Rods  Final Report (02-12-23 @ 11:47):    Growth in aerobic and anaerobic bottles: Enterobacter cloacae complex    See previous culture  [id number]        Urine Cx: ?  Blood Cx: ?    Imaging

## 2023-02-13 NOTE — PROGRESS NOTE ADULT - ASSESSMENT
66 yo M w/ pmhx of HTN, afib, BPH s/p TURP on 2/6/23; Bacteremia    -Reg diet   -F/u rpt bcx   -Abx   -ID f/u   -Teran   -OOB/Ambulate   -DVT ppx  -PICC once repeat blood cx NGTD

## 2023-02-13 NOTE — PROGRESS NOTE ADULT - ASSESSMENT
Bacteremia - with Enterococcus and Enterobacter  S/p sepsis   fevers - resolved    Plan:  ·	cont meropenem 1gm IV q8h x total of 28 days from his 1st negative blood culture.  ·	awaiting PICC line

## 2023-02-14 ENCOUNTER — TRANSCRIPTION ENCOUNTER (OUTPATIENT)
Age: 68
End: 2023-02-14

## 2023-02-14 ENCOUNTER — NON-APPOINTMENT (OUTPATIENT)
Age: 68
End: 2023-02-14

## 2023-02-14 ENCOUNTER — APPOINTMENT (OUTPATIENT)
Dept: ELECTROPHYSIOLOGY | Facility: CLINIC | Age: 68
End: 2023-02-14

## 2023-02-14 VITALS
HEART RATE: 60 BPM | RESPIRATION RATE: 17 BRPM | DIASTOLIC BLOOD PRESSURE: 74 MMHG | TEMPERATURE: 98 F | SYSTOLIC BLOOD PRESSURE: 130 MMHG | OXYGEN SATURATION: 100 %

## 2023-02-14 LAB
ANION GAP SERPL CALC-SCNC: 7 MMOL/L — SIGNIFICANT CHANGE UP (ref 5–17)
APTT BLD: 32 SEC — SIGNIFICANT CHANGE UP (ref 27.5–35.5)
BUN SERPL-MCNC: 15 MG/DL — SIGNIFICANT CHANGE UP (ref 7–18)
CALCIUM SERPL-MCNC: 9.7 MG/DL — SIGNIFICANT CHANGE UP (ref 8.4–10.5)
CHLORIDE SERPL-SCNC: 105 MMOL/L — SIGNIFICANT CHANGE UP (ref 96–108)
CO2 SERPL-SCNC: 27 MMOL/L — SIGNIFICANT CHANGE UP (ref 22–31)
CREAT SERPL-MCNC: 0.78 MG/DL — SIGNIFICANT CHANGE UP (ref 0.5–1.3)
EGFR: 99 ML/MIN/1.73M2 — SIGNIFICANT CHANGE UP
GLUCOSE SERPL-MCNC: 142 MG/DL — HIGH (ref 70–99)
HCT VFR BLD CALC: 34.7 % — LOW (ref 39–50)
HGB BLD-MCNC: 11.3 G/DL — LOW (ref 13–17)
INR BLD: 1.23 RATIO — HIGH (ref 0.88–1.16)
MCHC RBC-ENTMCNC: 29.7 PG — SIGNIFICANT CHANGE UP (ref 27–34)
MCHC RBC-ENTMCNC: 32.6 GM/DL — SIGNIFICANT CHANGE UP (ref 32–36)
MCV RBC AUTO: 91.3 FL — SIGNIFICANT CHANGE UP (ref 80–100)
NRBC # BLD: 0 /100 WBCS — SIGNIFICANT CHANGE UP (ref 0–0)
PLATELET # BLD AUTO: 269 K/UL — SIGNIFICANT CHANGE UP (ref 150–400)
POTASSIUM SERPL-MCNC: 3.9 MMOL/L — SIGNIFICANT CHANGE UP (ref 3.5–5.3)
POTASSIUM SERPL-SCNC: 3.9 MMOL/L — SIGNIFICANT CHANGE UP (ref 3.5–5.3)
PROTHROM AB SERPL-ACNC: 14.7 SEC — HIGH (ref 10.5–13.4)
RBC # BLD: 3.8 M/UL — LOW (ref 4.2–5.8)
RBC # FLD: 13.9 % — SIGNIFICANT CHANGE UP (ref 10.3–14.5)
SARS-COV-2 RNA SPEC QL NAA+PROBE: SIGNIFICANT CHANGE UP
SODIUM SERPL-SCNC: 139 MMOL/L — SIGNIFICANT CHANGE UP (ref 135–145)
WBC # BLD: 7.95 K/UL — SIGNIFICANT CHANGE UP (ref 3.8–10.5)
WBC # FLD AUTO: 7.95 K/UL — SIGNIFICANT CHANGE UP (ref 3.8–10.5)

## 2023-02-14 PROCEDURE — 36573 INSJ PICC RS&I 5 YR+: CPT

## 2023-02-14 PROCEDURE — 86803 HEPATITIS C AB TEST: CPT

## 2023-02-14 PROCEDURE — 81001 URINALYSIS AUTO W/SCOPE: CPT

## 2023-02-14 PROCEDURE — 96375 TX/PRO/DX INJ NEW DRUG ADDON: CPT

## 2023-02-14 PROCEDURE — 84100 ASSAY OF PHOSPHORUS: CPT

## 2023-02-14 PROCEDURE — 87077 CULTURE AEROBIC IDENTIFY: CPT

## 2023-02-14 PROCEDURE — 85027 COMPLETE CBC AUTOMATED: CPT

## 2023-02-14 PROCEDURE — 80048 BASIC METABOLIC PNL TOTAL CA: CPT

## 2023-02-14 PROCEDURE — 87521 HEPATITIS C PROBE&RVRS TRNSC: CPT

## 2023-02-14 PROCEDURE — 86140 C-REACTIVE PROTEIN: CPT

## 2023-02-14 PROCEDURE — 93971 EXTREMITY STUDY: CPT

## 2023-02-14 PROCEDURE — 77001 FLUOROGUIDE FOR VEIN DEVICE: CPT

## 2023-02-14 PROCEDURE — 87040 BLOOD CULTURE FOR BACTERIA: CPT

## 2023-02-14 PROCEDURE — 85025 COMPLETE CBC W/AUTO DIFF WBC: CPT

## 2023-02-14 PROCEDURE — 83605 ASSAY OF LACTIC ACID: CPT

## 2023-02-14 PROCEDURE — 87150 DNA/RNA AMPLIFIED PROBE: CPT

## 2023-02-14 PROCEDURE — 87637 SARSCOV2&INF A&B&RSV AMP PRB: CPT

## 2023-02-14 PROCEDURE — 87641 MR-STAPH DNA AMP PROBE: CPT

## 2023-02-14 PROCEDURE — 36415 COLL VENOUS BLD VENIPUNCTURE: CPT

## 2023-02-14 PROCEDURE — 71045 X-RAY EXAM CHEST 1 VIEW: CPT

## 2023-02-14 PROCEDURE — 87640 STAPH A DNA AMP PROBE: CPT

## 2023-02-14 PROCEDURE — 76937 US GUIDE VASCULAR ACCESS: CPT

## 2023-02-14 PROCEDURE — 85652 RBC SED RATE AUTOMATED: CPT

## 2023-02-14 PROCEDURE — 73562 X-RAY EXAM OF KNEE 3: CPT

## 2023-02-14 PROCEDURE — 87186 SC STD MICRODIL/AGAR DIL: CPT

## 2023-02-14 PROCEDURE — 87635 SARS-COV-2 COVID-19 AMP PRB: CPT

## 2023-02-14 PROCEDURE — C1751: CPT

## 2023-02-14 PROCEDURE — 93005 ELECTROCARDIOGRAM TRACING: CPT

## 2023-02-14 PROCEDURE — 96374 THER/PROPH/DIAG INJ IV PUSH: CPT

## 2023-02-14 PROCEDURE — 36573 INSJ PICC RS&I 5 YR+: CPT | Mod: LT

## 2023-02-14 PROCEDURE — 99285 EMERGENCY DEPT VISIT HI MDM: CPT

## 2023-02-14 PROCEDURE — 85730 THROMBOPLASTIN TIME PARTIAL: CPT

## 2023-02-14 PROCEDURE — 85610 PROTHROMBIN TIME: CPT

## 2023-02-14 PROCEDURE — 87086 URINE CULTURE/COLONY COUNT: CPT

## 2023-02-14 PROCEDURE — 80053 COMPREHEN METABOLIC PANEL: CPT

## 2023-02-14 PROCEDURE — 83735 ASSAY OF MAGNESIUM: CPT

## 2023-02-14 RX ORDER — SODIUM CHLORIDE 9 MG/ML
1 INJECTION INTRAMUSCULAR; INTRAVENOUS; SUBCUTANEOUS
Qty: 0 | Refills: 0 | DISCHARGE
Start: 2023-02-14

## 2023-02-14 RX ORDER — CHLORHEXIDINE GLUCONATE 213 G/1000ML
1 SOLUTION TOPICAL
Qty: 0 | Refills: 0 | DISCHARGE
Start: 2023-02-14

## 2023-02-14 RX ORDER — ACYCLOVIR SODIUM 500 MG
1 VIAL (EA) INTRAVENOUS
Qty: 0 | Refills: 0 | DISCHARGE
Start: 2023-02-14

## 2023-02-14 RX ORDER — MEROPENEM 1 G/30ML
1000 INJECTION INTRAVENOUS
Qty: 81 | Refills: 0
Start: 2023-02-14 | End: 2023-03-12

## 2023-02-14 RX ORDER — CHLORHEXIDINE GLUCONATE 213 G/1000ML
1 SOLUTION TOPICAL DAILY
Refills: 0 | Status: DISCONTINUED | OUTPATIENT
Start: 2023-02-15 | End: 2023-02-14

## 2023-02-14 RX ORDER — FINASTERIDE 5 MG/1
1 TABLET, FILM COATED ORAL
Qty: 30 | Refills: 0
Start: 2023-02-14 | End: 2023-03-15

## 2023-02-14 RX ORDER — SODIUM CHLORIDE 9 MG/ML
10 INJECTION INTRAMUSCULAR; INTRAVENOUS; SUBCUTANEOUS
Refills: 0 | Status: DISCONTINUED | OUTPATIENT
Start: 2023-02-14 | End: 2023-02-14

## 2023-02-14 RX ADMIN — AMLODIPINE BESYLATE 2.5 MILLIGRAM(S): 2.5 TABLET ORAL at 06:12

## 2023-02-14 RX ADMIN — MEROPENEM 100 MILLIGRAM(S): 1 INJECTION INTRAVENOUS at 17:54

## 2023-02-14 RX ADMIN — MEROPENEM 100 MILLIGRAM(S): 1 INJECTION INTRAVENOUS at 06:12

## 2023-02-14 RX ADMIN — FINASTERIDE 5 MILLIGRAM(S): 5 TABLET, FILM COATED ORAL at 12:52

## 2023-02-14 RX ADMIN — Medication 400 MILLIGRAM(S): at 12:52

## 2023-02-14 RX ADMIN — MEROPENEM 100 MILLIGRAM(S): 1 INJECTION INTRAVENOUS at 13:04

## 2023-02-14 NOTE — PROGRESS NOTE ADULT - ASSESSMENT
68 yo M w/ pmhx of HTN, afib, BPH s/p TURP on 2/6/23; Bacteremia  -Reg diet   -F/u rpt bcx - NGTD  -Abx   -ID f/u   -Teran   -OOB/Ambulate   -DVT ppx  -PICC today  -Plan for discharge with felix 1gm q8hr for 28 days from yesterday

## 2023-02-14 NOTE — PROCEDURE NOTE - ADDITIONAL PROCEDURE DETAILS
39 cm 4Fr  PICC inserted via the left brachial vein.  Sterile dressing applied.  Tip location SVC/ RA Junction.

## 2023-02-14 NOTE — PROGRESS NOTE ADULT - SUBJECTIVE AND OBJECTIVE BOX
Subjective  Pt seen and examined at bedside. Offers no acute complaints at this time.     Objective    Vital signs  T(F): , Max: 98.5 (02-13-23 @ 21:32)  HR: 59 (02-14-23 @ 05:27)  BP: 131/75 (02-14-23 @ 05:27)  SpO2: 100% (02-14-23 @ 05:27)  Wt(kg): --    Output     OUT:    Indwelling Catheter - Urethral (mL): 3000 mL  Total OUT: 3000 mL    Total NET: -3000 mL      Physical Exam  General: AAOx3, No acute distress  Abdomen: soft, nondistended, nontender, no rebound tenderness, no guarding, no palpable masses  : Normal external genitalia, lacy catheter in place, UO yellow, cloudy   Extremities: non edematous, no calf pain bilaterally    Labs          Culture - Blood (collected 02-12-23 @ 06:45)  Source: .Blood Blood-Peripheral  Preliminary Report (02-13-23 @ 13:01):    No growth to date.    Culture - Blood (collected 02-12-23 @ 06:40)  Source: .Blood Blood-Peripheral  Preliminary Report (02-13-23 @ 13:01):    No growth to date.    Culture - Urine (collected 02-09-23 @ 16:00)  Source: Clean Catch Clean Catch (Midstream)  Final Report (02-11-23 @ 12:28):    >=3 organisms. Probable collection contamination.    Culture - Blood (collected 02-09-23 @ 15:42)  Source: .Blood Blood-Peripheral  Gram Stain (02-10-23 @ 11:53):    Growth in aerobic bottle: Gram Negative Rods and Gram positive cocci in    pairs    Growth in anaerobic bottle: Gram Negative Rods  Final Report (02-13-23 @ 11:50):    Growth in aerobic and anaerobic bottles: Enterobacter cloacae complex    Growth in aerobic and anaerobic bottles: Enterococcus faecalis    ***Blood Panel PCR results on this specimen are available    approximately 3 hours after the Gram stain result.***    Gram stain, PCR, and/or culture results may not always    correspond due to difference in methodologies.    ************************************************************    This PCR assay was performed by multiplex PCR. This    Assay tests for 66 bacterial and resistance gene targets.    Please refer to the [facility] test directory    at [email or web address] for details.  Organism: Blood Culture PCR  Enterobacter cloacae complex  Enterococcus faecalis (02-13-23 @ 11:50)  Organism: Enterococcus faecalis (02-13-23 @ 11:50)      -  Ampicillin: S <=2 Predicts results to ampicillin/sulbactam, amoxacillin-clavulanate and  piperacillin-tazobactam.      -  Gentamicin synergy: S <=500      -  Streptomycin synergy: S <=1000      -  Vancomycin: S 4      Method Type: HARVEY  Organism: Enterobacter cloacae complex (02-13-23 @ 11:50)      -  Amikacin: S <=16      -  Ampicillin: R >16 These ampicillin results predict results for amoxicillin      -  Ampicillin/Sulbactam: R >16/8 Enterobacter, Klebsiella aerogenes, Citrobacter, and Serratia may develop resistance during prolonged therapy (3-4 days)      -  Aztreonam: R >16      -  Cefazolin: R >16 Enterobacter, Klebsiella aerogenes, Citrobacter, and Serratia may develop resistance during prolonged therapy (3-4 days)      -  Cefepime: S 4      -  Cefoxitin: R >16      -  Ceftriaxone: R >32 Enterobacter, Klebsiella aerogenes, Citrobacter, and Serratia may develop resistance during prolonged therapy      -  Ciprofloxacin: S <=0.25      -  Ertapenem: S <=0.5      -  Gentamicin: S <=2      -  Imipenem: S <=1      -  Levofloxacin: S <=0.5      -  Meropenem: S <=1      -  Piperacillin/Tazobactam: R >64      -  Tobramycin: S <=2      -  Trimethoprim/Sulfamethoxazole: S <=0.5/9.5      Method Type: HARVEY  Organism: Blood Culture PCR (02-13-23 @ 11:50)      -  Enterobacter cloacae complex: Detec      -  Enterococcus faecalis: Detec      Method Type: PCR    Culture - Blood (collected 02-09-23 @ 15:37)  Source: .Blood Blood-Peripheral  Gram Stain (02-10-23 @ 11:54):    Growth in aerobic and anaerobic bottles: Gram Negative Rods  Final Report (02-12-23 @ 11:47):    Growth in aerobic and anaerobic bottles: Enterobacter cloacae complex    See previous culture  [id number]        Urine Cx: ?  Blood Cx: ?    Imaging

## 2023-02-14 NOTE — PROGRESS NOTE ADULT - REASON FOR ADMISSION
r/o urosepsis

## 2023-02-14 NOTE — DISCHARGE NOTE NURSING/CASE MANAGEMENT/SOCIAL WORK - NSDCPEFALRISK_GEN_ALL_CORE
For information on Fall & Injury Prevention, visit: [email or web address] OR  [email or web address] OR  https://www.cdc.gov/steadi/patient.html

## 2023-02-14 NOTE — PROGRESS NOTE ADULT - ASSESSMENT
Bacteremia - with Enterococcus and Enterobacter  S/p sepsis   fevers - resolved    Plan:  ·	cont meropenem 1gm IV q8h x total of 28 days from his 1st negative blood culture.  ·	CBC , BMP every 2 weeks at home.  ·	DC planning for today.

## 2023-02-14 NOTE — DISCHARGE NOTE PROVIDER - NSDCFUSCHEDAPPT_GEN_ALL_CORE_FT
[facility]  Cardio Electro [phone]  Scheduled Appointment: 02/14/2023    [facility]  [facility]  UROLOGY [phone]  Scheduled Appointment: 02/22/2023    [facility]  ELECTROPH [phone]  Scheduled Appointment: 03/21/2023     [facility] Chance  [facility]  UROLOGY [address]  Scheduled Appointment: 02/22/2023    [facility]  ELECTROPH [phone]  Scheduled Appointment: 03/21/2023

## 2023-02-14 NOTE — DISCHARGE NOTE PROVIDER - NSDCMRMEDTOKEN_GEN_ALL_CORE_FT
acyclovir 400 mg oral tablet: 1 cap(s) orally once a day  amLODIPine 2.5 mg oral tablet: 1 tab(s) orally once a day  ergocalciferol 1.25 mg (50,000 intl units) oral capsule: 1 cap(s) orally once a week  finasteride 5 mg oral tablet: 1 tab(s) orally once a day  ibuprofen 400 mg oral tablet: 1 tab(s) orally every 6 hours, As Needed  meropenem 1000 mg intravenous injection: 1000 milligram(s) intravenous every 8 hours  pantoprazole 40 mg oral delayed release tablet: 1 tab(s) orally once a day  tamsulosin 0.4 mg oral capsule: 1 cap(s) orally once a day   acyclovir 400 mg oral tablet: 1 cap(s) orally once a day  amLODIPine 2.5 mg oral tablet: 1 tab(s) orally once a day  chlorhexidine 2% topical pad: 1 application topically once a day  ergocalciferol 1.25 mg (50,000 intl units) oral capsule: 1 cap(s) orally once a week  finasteride 5 mg oral tablet: 1 tab(s) orally once a day  ibuprofen 400 mg oral tablet: 1 tab(s) orally every 6 hours, As Needed  meropenem 1000 mg intravenous injection: 1000 milligram(s) intravenous every 8 hours  pantoprazole 40 mg oral delayed release tablet: 1 tab(s) orally once a day  sodium chloride 0.9% injectable solution: 1 application injectable once a day  tamsulosin 0.4 mg oral capsule: 1 cap(s) orally once a day

## 2023-02-14 NOTE — DISCHARGE NOTE PROVIDER - NSDCCPCAREPLAN_GEN_ALL_CORE_FT
PRINCIPAL DISCHARGE DIAGNOSIS  Diagnosis: Sepsis due to urinary tract infection  Assessment and Plan of Treatment:

## 2023-02-14 NOTE — DISCHARGE NOTE NURSING/CASE MANAGEMENT/SOCIAL WORK - PATIENT PORTAL LINK FT
You can access the FollowMyHealth Patient Portal offered by [facility] by registering at the following website: [email or web address]. By joining [facility]’s FollowMyHealth portal, you will also be able to view your health information using other applications (apps) compatible with our system.

## 2023-02-14 NOTE — PROGRESS NOTE ADULT - SUBJECTIVE AND OBJECTIVE BOX
67y Male    Meds:  acyclovir   Oral Tab/Cap 400 milliGRAM(s) Oral daily  meropenem  IVPB 1000 milliGRAM(s) IV Intermittent every 8 hours    Allergies    No Known Allergies    Intolerances        VITALS:  Vital Signs Last 24 Hrs  T(C): 36.8 (14 Feb 2023 12:47), Max: 36.9 (13 Feb 2023 21:32)  T(F): 98.2 (14 Feb 2023 12:47), Max: 98.5 (13 Feb 2023 21:32)  HR: 60 (14 Feb 2023 12:47) (59 - 60)  BP: 130/74 (14 Feb 2023 12:47) (126/66 - 131/75)  BP(mean): --  RR: 17 (14 Feb 2023 12:47) (17 - 18)  SpO2: 100% (14 Feb 2023 12:47) (98% - 100%)    Parameters below as of 14 Feb 2023 12:47  Patient On (Oxygen Delivery Method): room air        LABS/DIAGNOSTIC TESTS:                          11.3   7.95  )-----------( 269      ( 14 Feb 2023 07:30 )             34.7         02-14    139  |  105  |  15  ----------------------------<  142<H>  3.9   |  27  |  0.78    Ca    9.7      14 Feb 2023 07:30            CULTURES: .Blood Blood-Peripheral  02-12 @ 06:45   No growth to date.  --  --      .Blood Blood-Peripheral  02-12 @ 06:40   No growth to date.  --  --      Clean Catch Clean Catch (Midstream)  02-09 @ 16:00   >=3 organisms. Probable collection contamination.  --  --      .Blood Blood-Peripheral  02-09 @ 15:42   Growth in aerobic and anaerobic bottles: Enterobacter cloacae complex  Growth in aerobic and anaerobic bottles: Enterococcus faecalis  ***Blood Panel PCR results on this specimen are available  approximately 3 hours after the Gram stain result.***  Gram stain, PCR, and/or culture results may not always  correspond due to difference in methodologies.  ************************************************************  This PCR assay was performed by multiplex PCR. This  Assay tests for 66 bacterial and resistance gene targets.  Please refer to the [facility] test directory  at [email or web address] for details.  --  Blood Culture PCR  Enterobacter cloacae complex  Enterococcus faecalis      .Blood Blood-Peripheral  02-09 @ 15:37   Growth in aerobic and anaerobic bottles: Enterobacter cloacae complex  See previous culture  [id number]  --    Growth in aerobic and anaerobic bottles: Gram Negative Rods      Catheterized Catheterized  01-30 @ 12:05   >=3 organisms. Probable collection contamination.  --  --            RADIOLOGY:      ROS:  [  ] UNABLE TO ELICIT 67y Male who is doing well, he has no fevers , chills, abdominal pain , nausea , vomiting or diarrhea. He has clear urine in his lacy and has no other complaints. He is going to go home today after his evening dose of Meropenem as home care will start tomorrow am.     Meds:  acyclovir   Oral Tab/Cap 400 milliGRAM(s) Oral daily  meropenem  IVPB 1000 milliGRAM(s) IV Intermittent every 8 hours    Allergies    No Known Allergies    Intolerances        VITALS:  Vital Signs Last 24 Hrs  T(C): 36.8 (14 Feb 2023 12:47), Max: 36.9 (13 Feb 2023 21:32)  T(F): 98.2 (14 Feb 2023 12:47), Max: 98.5 (13 Feb 2023 21:32)  HR: 60 (14 Feb 2023 12:47) (59 - 60)  BP: 130/74 (14 Feb 2023 12:47) (126/66 - 131/75)  BP(mean): --  RR: 17 (14 Feb 2023 12:47) (17 - 18)  SpO2: 100% (14 Feb 2023 12:47) (98% - 100%)    Parameters below as of 14 Feb 2023 12:47  Patient On (Oxygen Delivery Method): room air        LABS/DIAGNOSTIC TESTS:                          11.3   7.95  )-----------( 269      ( 14 Feb 2023 07:30 )             34.7         02-14    139  |  105  |  15  ----------------------------<  142<H>  3.9   |  27  |  0.78    Ca    9.7      14 Feb 2023 07:30            CULTURES: .Blood Blood-Peripheral  02-12 @ 06:45   No growth to date.  --  --      .Blood Blood-Peripheral  02-12 @ 06:40   No growth to date.  --  --      Clean Catch Clean Catch (Midstream)  02-09 @ 16:00   >=3 organisms. Probable collection contamination.  --  --      .Blood Blood-Peripheral  02-09 @ 15:42   Growth in aerobic and anaerobic bottles: Enterobacter cloacae complex  Growth in aerobic and anaerobic bottles: Enterococcus faecalis  ***Blood Panel PCR results on this specimen are available  approximately 3 hours after the Gram stain result.***  Gram stain, PCR, and/or culture results may not always  correspond due to difference in methodologies.  ************************************************************  This PCR assay was performed by multiplex PCR. This  Assay tests for 66 bacterial and resistance gene targets.  Please refer to the [facility] Labs test directory  at [email or web address] for details.  --  Blood Culture PCR  Enterobacter cloacae complex  Enterococcus faecalis      .Blood Blood-Peripheral  02-09 @ 15:37   Growth in aerobic and anaerobic bottles: Enterobacter cloacae complex  See previous culture  [id number]  --    Growth in aerobic and anaerobic bottles: Gram Negative Rods      Catheterized Catheterized  01-30 @ 12:05   >=3 organisms. Probable collection contamination.  --  --            RADIOLOGY:      ROS:  [  ] UNABLE TO ELICIT

## 2023-02-14 NOTE — DISCHARGE NOTE PROVIDER - HOSPITAL COURSE
66 yo M w/ pmhx of HTN, afib, BPH s/p TURP on 2/6/23 p/w acute development of chills & fever this afternoon. Pt reports having and uncomplicated TURP on 2/6 with lacy removal earlier today at urologist office and claimed he was able to void subsequently. Pt reports development of fatigue, fever, chills, and tremors shortly after that have persisted. Pt denies any current CP, SOB, abdominal pain, N/V/D, or difficulty urinating currently. Pt currently feels fatigued. At intake, Pt was found to be hypotensive to 80s/40s, repeat BP was 95/57 at beside, tachycardic and T of 100.2 after taking tylenol at 1530. Denies any hematuria or dysuria. No chest pain, cough. Of note, patient underwent R TKR in December & has noted residual swelling in knee and leg that has been improving.    Patient admitted to [facility] for management of infection. Got Right knee xray and lower extremity U/S which was negative. Otherwise, urine culture and blood cultures were positive for enterococcus. Patient was then started on zosyn and recently switched to meropenem. Repeat blood cultures were negative on 2/13. ID on board recommending discharge with IV meropenem 1g every 8 hours for 27 days from negative prelim blood cultures which resulted on 2/13. Plan for discharge today once PICC line is placed and home care for IV abx is set up.

## 2023-02-14 NOTE — DISCHARGE NOTE PROVIDER - CARE PROVIDER_API CALL
Gm Chance)  Urology  [address], [address]  [place] [address]  Phone: [phone]  Fax: [phone]  Established Patient  Follow Up Time: 2 weeks

## 2023-02-14 NOTE — DISCHARGE NOTE NURSING/CASE MANAGEMENT/SOCIAL WORK - NSDCFUADDAPPT_GEN_ALL_CORE_FT
Gm Chance)  Urology  [address], [address]  [place] [address]  Phone: [phone]  Fax: [phone]  Established Patient  Follow Up Time: 2 weeks 2/14/23 1AND 2/22

## 2023-02-15 LAB
MRSA PCR RESULT.: SIGNIFICANT CHANGE UP
S AUREUS DNA NOSE QL NAA+PROBE: SIGNIFICANT CHANGE UP

## 2023-02-17 LAB
CULTURE RESULTS: SIGNIFICANT CHANGE UP
CULTURE RESULTS: SIGNIFICANT CHANGE UP
SPECIMEN SOURCE: SIGNIFICANT CHANGE UP
SPECIMEN SOURCE: SIGNIFICANT CHANGE UP

## 2023-02-22 ENCOUNTER — APPOINTMENT (OUTPATIENT)
Dept: UROLOGY | Facility: CLINIC | Age: 68
End: 2023-02-22
Payer: COMMERCIAL

## 2023-02-22 VITALS
DIASTOLIC BLOOD PRESSURE: 88 MMHG | OXYGEN SATURATION: 99 % | HEART RATE: 79 BPM | SYSTOLIC BLOOD PRESSURE: 147 MMHG | HEIGHT: 78 IN | TEMPERATURE: 97.7 F | WEIGHT: 204 LBS | BODY MASS INDEX: 23.6 KG/M2

## 2023-02-22 PROCEDURE — 99024 POSTOP FOLLOW-UP VISIT: CPT

## 2023-02-26 NOTE — HISTORY OF PRESENT ILLNESS
[FreeTextEntry1] : cc \par s/p turp \par doing well\par \par abd soft nt nd \par lacy clear urine \par \par lacy removed after bladder filled via lacy \par voided well\par call difficulty voiding

## 2023-02-28 ENCOUNTER — APPOINTMENT (OUTPATIENT)
Dept: ELECTROPHYSIOLOGY | Facility: CLINIC | Age: 68
End: 2023-02-28
Payer: COMMERCIAL

## 2023-02-28 ENCOUNTER — NON-APPOINTMENT (OUTPATIENT)
Age: 68
End: 2023-02-28

## 2023-02-28 PROCEDURE — 93298 REM INTERROG DEV EVAL SCRMS: CPT

## 2023-02-28 PROCEDURE — G2066: CPT

## 2023-03-02 ENCOUNTER — APPOINTMENT (OUTPATIENT)
Dept: UROLOGY | Facility: CLINIC | Age: 68
End: 2023-03-02
Payer: COMMERCIAL

## 2023-03-02 VITALS
BODY MASS INDEX: 23.6 KG/M2 | HEIGHT: 78 IN | SYSTOLIC BLOOD PRESSURE: 151 MMHG | WEIGHT: 204 LBS | DIASTOLIC BLOOD PRESSURE: 85 MMHG

## 2023-03-02 PROCEDURE — 99024 POSTOP FOLLOW-UP VISIT: CPT

## 2023-03-07 ENCOUNTER — NON-APPOINTMENT (OUTPATIENT)
Age: 68
End: 2023-03-07

## 2023-03-08 NOTE — HISTORY OF PRESENT ILLNESS
[FreeTextEntry1] : cc \par s/p turp \par sepsis after cath removal \par now with picc line getting iv abx \par feels well

## 2023-03-08 NOTE — ASSESSMENT
[FreeTextEntry1] : bladder filled via lacy and removed \par pt voided well\par call for difficulty voiding \par f/u check pvr

## 2023-03-08 NOTE — PHYSICAL EXAM
[General Appearance - Well Developed] : well developed [General Appearance - Well Nourished] : well nourished [Normal Appearance] : normal appearance [Well Groomed] : well groomed [General Appearance - In No Acute Distress] : no acute distress [Abdomen Soft] : soft [Abdomen Tenderness] : non-tender [Costovertebral Angle Tenderness] : no ~M costovertebral angle tenderness [Urethral Meatus] : meatus normal [Urinary Bladder Findings] : the bladder was normal on palpation [Scrotum] : the scrotum was normal [Testes Mass (___cm)] : there were no testicular masses [Edema] : no peripheral edema [] : no respiratory distress [Respiration, Rhythm And Depth] : normal respiratory rhythm and effort [Exaggerated Use Of Accessory Muscles For Inspiration] : no accessory muscle use [Oriented To Time, Place, And Person] : oriented to person, place, and time [Affect] : the affect was normal [Mood] : the mood was normal [Not Anxious] : not anxious [Normal Station and Gait] : the gait and station were normal for the patient's age [No Focal Deficits] : no focal deficits [No Palpable Adenopathy] : no palpable adenopathy [FreeTextEntry1] : clear urine in lacy

## 2023-03-12 NOTE — HISTORY OF PRESENT ILLNESS
[FreeTextEntry1] : cc \par s/p turp \par sepsis after cath removal \par now with picc line getting iv abx \par feels well\par cath removed \par voiding well\par \par abd soft nt nd\par minimal pvr \par \par complete abx

## 2023-04-04 ENCOUNTER — NON-APPOINTMENT (OUTPATIENT)
Age: 68
End: 2023-04-04

## 2023-04-04 ENCOUNTER — APPOINTMENT (OUTPATIENT)
Dept: ELECTROPHYSIOLOGY | Facility: CLINIC | Age: 68
End: 2023-04-04
Payer: COMMERCIAL

## 2023-04-04 PROCEDURE — 93298 REM INTERROG DEV EVAL SCRMS: CPT

## 2023-04-04 PROCEDURE — G2066: CPT

## 2023-04-27 ENCOUNTER — NON-APPOINTMENT (OUTPATIENT)
Age: 68
End: 2023-04-27

## 2023-05-03 ENCOUNTER — APPOINTMENT (OUTPATIENT)
Dept: UROLOGY | Facility: CLINIC | Age: 68
End: 2023-05-03
Payer: COMMERCIAL

## 2023-05-03 DIAGNOSIS — R33.9 RETENTION OF URINE, UNSPECIFIED: ICD-10-CM

## 2023-05-03 PROCEDURE — 99214 OFFICE O/P EST MOD 30 MIN: CPT | Mod: 24

## 2023-05-03 NOTE — HISTORY OF PRESENT ILLNESS
[FreeTextEntry1] : cc \par s/p turp \par sepsis after cath removal \par requiring  iv abx \par feels well\par voidng well\par no dysuria or hematuria

## 2023-05-08 ENCOUNTER — APPOINTMENT (OUTPATIENT)
Dept: ELECTROPHYSIOLOGY | Facility: CLINIC | Age: 68
End: 2023-05-08

## 2023-05-08 LAB — BACTERIA UR CULT: NORMAL

## 2023-05-10 ENCOUNTER — OUTPATIENT (OUTPATIENT)
Dept: OUTPATIENT SERVICES | Facility: HOSPITAL | Age: 68
LOS: 1 days | Discharge: HOME | End: 2023-05-10
Payer: COMMERCIAL

## 2023-05-10 DIAGNOSIS — Z87.898 PERSONAL HISTORY OF OTHER SPECIFIED CONDITIONS: ICD-10-CM

## 2023-05-10 DIAGNOSIS — Z95.818 PRESENCE OF OTHER CARDIAC IMPLANTS AND GRAFTS: Chronic | ICD-10-CM

## 2023-05-10 PROCEDURE — 33286 RMVL SUBQ CAR RHYTHM MNTR: CPT

## 2023-05-10 RX ORDER — IBUPROFEN 200 MG
1 TABLET ORAL
Qty: 0 | Refills: 0 | DISCHARGE

## 2023-05-10 RX ORDER — ERGOCALCIFEROL 1.25 MG/1
1 CAPSULE ORAL
Qty: 0 | Refills: 0 | DISCHARGE

## 2023-05-10 RX ORDER — TAMSULOSIN HYDROCHLORIDE 0.4 MG/1
1 CAPSULE ORAL
Qty: 0 | Refills: 0 | DISCHARGE

## 2023-05-10 RX ORDER — PANTOPRAZOLE SODIUM 20 MG/1
1 TABLET, DELAYED RELEASE ORAL
Qty: 0 | Refills: 0 | DISCHARGE

## 2023-05-10 RX ORDER — AMLODIPINE BESYLATE 2.5 MG/1
1 TABLET ORAL
Qty: 0 | Refills: 0 | DISCHARGE

## 2023-05-10 NOTE — CHART NOTE - NSCHARTNOTEFT_GEN_A_CORE
pt. s/p ILR removal    L ACW was covered with a  clear with a gauge and clear Tegaderm. It was clean, dry, and intact. No bleeding, drainage hematoma, or ecchymosis appreciated.        Post-op ILR removal instruction has been verbally explained and given to the patient. Patient expressed understanding and all questions were answered. A carbon copy is located in the patient chart  Pt. will get telephone call from the EP clinic for following up    You can return to normal activites 24hours after the procedure   No scrubbing the incision site for 2 weeks  No lotion, ointment, powder or direct sunlight to the incision site for 2 weeks   No swimming pool, Jacuzzi, or bath for 5 days.   Patient can shower 24 hours after procedure. Pat the area dry  Pt was instructed to call [phone] if the following occurs:      - fever with temperature > 100.6      - swelling, drainage or bleeding at the site incision

## 2023-05-10 NOTE — H&P CARDIOLOGY - HISTORY OF PRESENT ILLNESS
66 y/o M w/ PMH of HTN, Afib, BPH s/p TURP and Syncope s/p ILR presents for ILR explantation. Pt's device reached RRT on 4/26/23.

## 2023-05-17 NOTE — ASU PREOP CHECKLIST - BMI (KG/M2)
Fusion, spine, lumbar, ALIF, 2 or more levels/yes
27.8
Stage 2 unilateral L4-S1  posterior spinal fusion with [employer] robotic system/yes

## 2023-05-23 ENCOUNTER — APPOINTMENT (OUTPATIENT)
Dept: ELECTROPHYSIOLOGY | Facility: CLINIC | Age: 68
End: 2023-05-23

## 2023-06-28 ENCOUNTER — RX RENEWAL (OUTPATIENT)
Age: 68
End: 2023-06-28

## 2023-09-06 ENCOUNTER — RX RENEWAL (OUTPATIENT)
Age: 68
End: 2023-09-06

## 2023-09-06 RX ORDER — TADALAFIL 20 MG/1
20 TABLET ORAL
Qty: 90 | Refills: 0 | Status: ACTIVE | COMMUNITY
Start: 2020-09-09 | End: 1900-01-01

## 2023-09-23 NOTE — DISCHARGE NOTE PROVIDER - NSDCFUADDINST_GEN_ALL_CORE_FT
-After your surgery, it is common to have some blood in the urine.  The urine may appear pink or even red.  If you start to notice thick blood or many blood clots in the urine, call your physician.  Otherwise, drink a lot of fluids in order to dilute the urine well.  If you are unable to urinate or you feel abdominal fullness, sitting in a tub of warm water (sitz bath) may help; if not, call your physician.    -If you have a fever over 101F, call your physician.    -Provided that you are not restricted with fluids by your physician, you should drink 6-8 (8 oz.) glasses of fluid per day.    -You may experience weakness after you go home; this is normal.  You will slowly regain your strength, during which time you may gradually increase your level of activity.  	Do not exercise, lift heavy objects, drive, or resume sexual activity until you are told to do so by your physician.  	You may walk around and even climb stairs.  	Do not allow yourself to become constipated, as straining may cause bleeding.  Take stool softeners (ex. Colace) or a laxative (ex. Senekot, ExLax) if needed.    -Most patients are discharged home with a few days of antibiotics.  Take these pills as instructed.  If you miss one dose, resume with the next dose and complete the entire course of therapy.    -After you arrive at home, call your Urologist’s office to arrange a follow-up appointment.    -Home care is set up for IV meropenem via PICC line 1g every 8 hours for 27 more days No - the patient is unable to be screened due to medical condition

## 2023-11-03 ENCOUNTER — APPOINTMENT (OUTPATIENT)
Dept: UROLOGY | Facility: CLINIC | Age: 68
End: 2023-11-03
Payer: MEDICARE

## 2023-11-03 VITALS
HEIGHT: 78 IN | HEART RATE: 89 BPM | BODY MASS INDEX: 25.34 KG/M2 | WEIGHT: 219 LBS | DIASTOLIC BLOOD PRESSURE: 78 MMHG | TEMPERATURE: 98.2 F | OXYGEN SATURATION: 98 % | SYSTOLIC BLOOD PRESSURE: 148 MMHG

## 2023-11-03 DIAGNOSIS — R97.20 ELEVATED PROSTATE, SPECIFIC ANTIGEN [PSA]: ICD-10-CM

## 2023-11-03 DIAGNOSIS — N40.0 BENIGN PROSTATIC HYPERPLASIA WITHOUT LOWER URINARY TRACT SYMPMS: ICD-10-CM

## 2023-11-03 DIAGNOSIS — N52.9 MALE ERECTILE DYSFUNCTION, UNSPECIFIED: ICD-10-CM

## 2023-11-03 PROCEDURE — 99214 OFFICE O/P EST MOD 30 MIN: CPT

## 2023-11-10 LAB — PSA SERPL-MCNC: 5.81 NG/ML

## 2024-02-22 NOTE — H&P CARDIOLOGY - VASCULAR
Social Work - Follow-Up  [facility]    Data/Intervention:    Patient Name: Jeanmarie Mclean Goes By: Jeanmarie    /Age: 1959 (65 year old)    Reason for Follow-Up:  Procedure question    Collaborated With:    -Jeanmarie- [phone]  -Ortho DELORES Louis    Intervention/Education/Resources Provided:  I received a vm from Jeanmarie noting he needs my help and asked if his son can be in the procedure room with him while he gets a mass removed from his thigh. Jeanmarie explained he has called and email his son's , and explained his son cannot be in the waiting room because he will elope.     I called Jeanmarie back and left a vm explaining I will follow up with the Ortho clinic regarding his question as I do not know the answer to this. I also explained that if Jeanmarie would like me to I would be happy to reach out to his son's  to give an extra nudge if he is not hearing from them. I asked for Jeanmarie to call me back with their contact information if he wants me to reach out.     I spoke with DELORES Louis and updated her as to Jeanmarie's procedure question and she said she will call him about this.     Assessment/Plan:  No specific follow up is planned unless I hear from Jeanmarie that he wants me to call his son's .     Previously provided patient/family with writer's contact information and availability.      SAMINA Harding, [facility]    [facility]  Ph: [phone], Pgr: [phone]  2024       Equal and normal pulses (carotid, femoral, dorsalis pedis)

## 2024-11-20 ENCOUNTER — APPOINTMENT (OUTPATIENT)
Dept: UROLOGY | Facility: CLINIC | Age: 69
End: 2024-11-20
Payer: MEDICARE

## 2024-11-20 VITALS
DIASTOLIC BLOOD PRESSURE: 77 MMHG | HEART RATE: 67 BPM | WEIGHT: 198 LBS | SYSTOLIC BLOOD PRESSURE: 136 MMHG | TEMPERATURE: 98.1 F | HEIGHT: 78 IN | BODY MASS INDEX: 22.91 KG/M2 | OXYGEN SATURATION: 97 %

## 2024-11-20 DIAGNOSIS — N52.9 MALE ERECTILE DYSFUNCTION, UNSPECIFIED: ICD-10-CM

## 2024-11-20 DIAGNOSIS — N40.0 BENIGN PROSTATIC HYPERPLASIA WITHOUT LOWER URINARY TRACT SYMPMS: ICD-10-CM

## 2024-11-20 PROCEDURE — 99204 OFFICE O/P NEW MOD 45 MIN: CPT

## 2024-11-20 PROCEDURE — G2211 COMPLEX E/M VISIT ADD ON: CPT

## 2024-11-22 LAB
ANION GAP SERPL CALC-SCNC: 13 MMOL/L
BUN SERPL-MCNC: 18 MG/DL
CALCIUM SERPL-MCNC: 9.7 MG/DL
CHLORIDE SERPL-SCNC: 101 MMOL/L
CO2 SERPL-SCNC: 28 MMOL/L
CREAT SERPL-MCNC: 0.9 MG/DL
EGFR: 94 ML/MIN/1.73M2
GLUCOSE SERPL-MCNC: 84 MG/DL
POTASSIUM SERPL-SCNC: 4.5 MMOL/L
PSA SERPL-MCNC: 6.3 NG/ML
SODIUM SERPL-SCNC: 143 MMOL/L

## 2024-12-25 PROBLEM — F10.90 ALCOHOL USE: Status: RESOLVED | Noted: 2020-01-17 | Resolved: 2024-12-25

## 2025-03-06 ENCOUNTER — APPOINTMENT (OUTPATIENT)
Dept: UROLOGY | Facility: CLINIC | Age: 70
End: 2025-03-06
Payer: MEDICARE

## 2025-03-06 VITALS
TEMPERATURE: 97.1 F | BODY MASS INDEX: 23.26 KG/M2 | HEIGHT: 78 IN | SYSTOLIC BLOOD PRESSURE: 143 MMHG | DIASTOLIC BLOOD PRESSURE: 75 MMHG | OXYGEN SATURATION: 96 % | WEIGHT: 201 LBS | HEART RATE: 64 BPM

## 2025-03-06 DIAGNOSIS — N52.9 MALE ERECTILE DYSFUNCTION, UNSPECIFIED: ICD-10-CM

## 2025-03-06 DIAGNOSIS — R97.20 ELEVATED PROSTATE, SPECIFIC ANTIGEN [PSA]: ICD-10-CM

## 2025-03-06 DIAGNOSIS — N40.0 BENIGN PROSTATIC HYPERPLASIA WITHOUT LOWER URINARY TRACT SYMPMS: ICD-10-CM

## 2025-03-06 PROCEDURE — 99213 OFFICE O/P EST LOW 20 MIN: CPT

## 2025-03-06 PROCEDURE — G2211 COMPLEX E/M VISIT ADD ON: CPT

## 2025-03-08 LAB
LH SERPL-ACNC: 18.2 IU/L
PROLACTIN SERPL-MCNC: 7.7 NG/ML
SHBG SERPL-SCNC: 56.3 NMOL/L
TESTOST SERPL-MCNC: 532 NG/DL

## 2025-07-10 NOTE — ED PROVIDER NOTE - HIV OFFER
I reviewed the H&P, I examined the patient, and there are no changes in the patient's condition.    
Opt out

## (undated) DEVICE — DRAINAGE BAG URINARY 2L

## (undated) DEVICE — SYR LUER LOK 20CC

## (undated) DEVICE — BLADDER EVACUATOR ELLIK DISP STERILE

## (undated) DEVICE — PACK CYSTO

## (undated) DEVICE — FOLEY CATH 3-WAY 22FR 30CC LATEX LUBRICATH

## (undated) DEVICE — WARMING BLANKET UPPER ADULT

## (undated) DEVICE — GOWN XXL

## (undated) DEVICE — BASIN SET SINGLE

## (undated) DEVICE — SOL IRR POUR NS 0.9% 1500ML

## (undated) DEVICE — VENODYNE/SCD SLEEVE CALF MEDIUM

## (undated) DEVICE — PREP BETADINE SPONGE STICKS

## (undated) DEVICE — FOLEY CATH 3-WAY 24FR 30CC LATEX LUBRICATH

## (undated) DEVICE — SYR CATH TIP 2 OZ

## (undated) DEVICE — ELCTR PLASMA LOOP MEDIUM ANGLED 24FR 12-30 DEG

## (undated) DEVICE — ELCTR PLASMA ROLLER 24FR 12-30 DEG

## (undated) DEVICE — ELCTR PLASMA BUTTON OVAL 24FR 12-30 DEG